# Patient Record
Sex: FEMALE | Race: WHITE | Employment: FULL TIME | URBAN - METROPOLITAN AREA
[De-identification: names, ages, dates, MRNs, and addresses within clinical notes are randomized per-mention and may not be internally consistent; named-entity substitution may affect disease eponyms.]

---

## 2017-01-17 ENCOUNTER — TELEPHONE (OUTPATIENT)
Dept: FAMILY MEDICINE | Facility: CLINIC | Age: 52
End: 2017-01-17

## 2017-01-17 NOTE — TELEPHONE ENCOUNTER
She had vomiting and diarrhea several days ago. Her vomiting improved, several days later her diarrhea continues. When her symptoms first started she had severe stomach pain, her stomach pain improved. She was taking Pepto tablets and pushing fluids. She has been improving, but still feels weak. She noticed some blood in the toilet, she is concerned it may be a hemorrhoid, she has had hemorrhoids in the past. Patient advised to use her hemorrhoid cream, if bleeding persists, follow up with appointment tomorrow.

## 2017-02-13 ENCOUNTER — TELEPHONE (OUTPATIENT)
Dept: FAMILY MEDICINE | Facility: CLINIC | Age: 52
End: 2017-02-13

## 2017-02-13 DIAGNOSIS — Z86.2 HISTORY OF ANEMIA: Primary | ICD-10-CM

## 2017-02-13 DIAGNOSIS — N95.1 SYMPTOMATIC MENOPAUSAL OR FEMALE CLIMACTERIC STATES: ICD-10-CM

## 2017-02-13 NOTE — TELEPHONE ENCOUNTER
Patient would like an order for a blood test. She would like to go to the [facility] building. She can be reached at the incoming number.

## 2017-02-14 DIAGNOSIS — Z86.2 HISTORY OF ANEMIA: ICD-10-CM

## 2017-02-14 DIAGNOSIS — E55.9 VITAMIN D DEFICIENCY: ICD-10-CM

## 2017-02-14 LAB
DEPRECATED CALCIDIOL+CALCIFEROL SERPL-MC: 43 UG/L (ref 20–75)
FERRITIN SERPL-MCNC: 33 NG/ML (ref 8–252)
IRON SERPL-MCNC: 88 UG/DL (ref 35–180)

## 2017-02-15 ENCOUNTER — TELEPHONE (OUTPATIENT)
Dept: FAMILY MEDICINE | Facility: CLINIC | Age: 52
End: 2017-02-15

## 2017-02-15 NOTE — TELEPHONE ENCOUNTER
Patient concerned that her ferritin is lower than previous. She is wondering if she should take iron supplementation or multivitamin. Patient will start first with a MV and if she feels fatigue and symptoms of anemia, she will come in for lab and get CBC. Reviewed cbc from 2014 and this was WNL. She reports she has a hx of anemia and mother had hx of anemia as well. She is experience feelings of fatigue. She will start with MV and update clinic of symptoms of fatigue continue.

## 2017-06-14 DIAGNOSIS — E03.9 ACQUIRED HYPOTHYROIDISM: ICD-10-CM

## 2017-06-14 RX ORDER — LEVOTHYROXINE SODIUM 100 UG/1
100 TABLET ORAL DAILY
Qty: 60 TABLET | Refills: 0 | Status: SHIPPED | OUTPATIENT
Start: 2017-06-14 | End: 2017-08-11

## 2017-06-14 NOTE — TELEPHONE ENCOUNTER
Received refill request for levothyroxine. Patient due to be seen for thyroid check in August. Left message for patient informing her that temporary refill would be sent to get her through until she is due to be seen.

## 2017-06-17 ENCOUNTER — HOSPITAL ENCOUNTER (EMERGENCY)
Facility: CLINIC | Age: 52
Discharge: HOME | End: 2017-06-18
Attending: EMERGENCY MEDICINE | Admitting: EMERGENCY MEDICINE
Payer: COMMERCIAL

## 2017-06-17 ENCOUNTER — APPOINTMENT (OUTPATIENT)
Dept: GENERAL RADIOLOGY | Facility: CLINIC | Age: 52
End: 2017-06-17
Attending: EMERGENCY MEDICINE
Payer: COMMERCIAL

## 2017-06-17 VITALS
OXYGEN SATURATION: 96 % | HEIGHT: 65 IN | RESPIRATION RATE: 16 BRPM | SYSTOLIC BLOOD PRESSURE: 136 MMHG | DIASTOLIC BLOOD PRESSURE: 90 MMHG | TEMPERATURE: 98.6 F | HEART RATE: 88 BPM

## 2017-06-17 DIAGNOSIS — S92.355A CLOSED NONDISPLACED FRACTURE OF FIFTH METATARSAL BONE OF LEFT FOOT, INITIAL ENCOUNTER: ICD-10-CM
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
## 2017-06-17 PROCEDURE — 25000132 ZZH RX MED GY IP 250 OP 250 PS 637: Performed by: EMERGENCY MEDICINE

## 2017-06-17 PROCEDURE — 73610 X-RAY EXAM OF ANKLE: CPT | Mod: LT

## 2017-06-17 PROCEDURE — 73630 X-RAY EXAM OF FOOT: CPT | Mod: LT

## 2017-06-17 PROCEDURE — 99283 EMERGENCY DEPT VISIT LOW MDM: CPT | Mod: Z6 | Performed by: EMERGENCY MEDICINE

## 2017-06-17 PROCEDURE — 99283 EMERGENCY DEPT VISIT LOW MDM: CPT

## 2017-06-17 RX ORDER — ACETAMINOPHEN 500 MG
1000 TABLET ORAL ONCE
Status: COMPLETED | OUTPATIENT
Start: 2017-06-17 | End: 2017-06-17

## 2017-06-17 RX ORDER — MULTIVITAMIN,THERAPEUTIC
1 TABLET ORAL DAILY
COMMUNITY
End: 2020-10-28

## 2017-06-17 RX ADMIN — ACETAMINOPHEN 1000 MG: 500 TABLET, FILM COATED ORAL at 23:56

## 2017-06-17 NOTE — ED AVS SNAPSHOT
[facility], Emergency Department    [address] [address]    Phone:  [phone]                                       Paradise Michael   MRN: [id number]    Department:  [facility], Emergency Department   Date of Visit:  6/17/2017           Patient Information     Date Of Birth          1965        Your diagnoses for this visit were:     Closed nondisplaced fracture of fifth metatarsal bone of left foot, initial encounter        You were seen by Zak Mayo MD.        Discharge Instructions       Please make an appointment to follow up with Orthopedics (phone: [phone]) in 3-5 days.    Ice and elevation.    Keep foot immobilized in boot until follow-up.    Do not bear weight on left foot, use crutches for ambulation.    Tylenol for pain.    Return to the emergency Department for any problems.      24 Hour Appointment Hotline       To make an appointment at any [facility] clinic, call [phone] ([phone]). If you don't have a family doctor or clinic, we will help you find one. [facility] clinics are conveniently located to serve the needs of you and your family.          ED Discharge Orders     Crutches       Use gait belt during crutch training.            Equalizer Walker           ORTHO  REFERRAL       [facility] is referring you to the Orthopedic  Services at [facility].       The  Representative will assist you in the coordination of your Orthopedic and Musculoskeletal Care as prescribed by your physician.    The  Representative will call you within 1 business day to help schedule your appointment, or you may contact the  Representative at:    All areas ~ [phone]     Type of Referral : [facility] / Foot & Ankle Surgery       Timeframe requested: 3 - 5 days    Coverage of these services is subject to the terms and limitations of your health insurance plan.  Please  call member services at your health plan with any benefit or coverage questions.      If X-rays, CT or MRI's have been performed, please contact the facility where they were done to arrange for , prior to your scheduled appointment.  Please bring this referral request to your appointment and present it to your specialist.                     Review of your medicines      Our records show that you are taking the medicines listed below. If these are incorrect, please call your family doctor or clinic.        Dose / Directions Last dose taken    levothyroxine 100 MCG tablet   Commonly known as:  SYNTHROID/LEVOTHROID   Dose:  100 mcg   Quantity:  60 tablet        Take 1 tablet (100 mcg) by mouth daily   Refills:  0        multivitamin, therapeutic Tabs tablet   Dose:  1 tablet        Take 1 tablet by mouth daily   Refills:  0        norgestrel-ethinyl estradiol 0.3-30 MG-MCG per tablet   Commonly known as:  LO/OVRAL (28)   Quantity:  120 tablet        Take only active pills, no placebos for 3 months then 1 week of the placebo for a withdrawal bleed.   Refills:  4                Procedures and tests performed during your visit     Ankle XR, G/E 3 views, left    Foot XR, G/E 3 views, left      Orders Needing Specimen Collection     None      Pending Results     Date and Time Order Name Status Description    6/17/2017 2232 Ankle XR, G/E 3 views, left Preliminary     6/17/2017 2232 Foot XR, G/E 3 views, left Preliminary             Pending Culture Results     No orders found from 6/15/2017 to 6/18/2017.            Pending Results Instructions     If you had any lab results that were not finalized at the time of your Discharge, you can call the ED Lab Result RN at [phone]. You will be contacted by this team for any positive Lab results or changes in treatment. The nurses are available 7 days a week from 10A to 6:30P.  You can leave a message 24 hours per day and they will return your call.        Thank you for  choosing [facility]       Thank you for choosing [facility] for your care. Our goal is always to provide you with excellent care. Hearing back from our patients is one way we can continue to improve our services. Please take a few minutes to complete the written survey that you may receive in the mail after you visit with us. Thank you!        [employer] Information     [employer] gives you secure access to your electronic health record. If you see a primary care provider, you can also send messages to your care team and make appointments. If you have questions, please call your primary care clinic.  If you do not have a primary care provider, please call [phone] and they will assist you.        Care EveryWhere ID     This is your Care EveryWhere ID. This could be used by other organizations to access your [facility] medical records  [id number]        After Visit Summary       This is your record. Keep this with you and show to your community pharmacist(s) and doctor(s) at your next visit.

## 2017-06-17 NOTE — ED AVS SNAPSHOT
[facility], [place], Emergency Department    [address] [place] [address]    Phone:  [phone]                                       Paradise Michael   MRN: [id number]    Department:  [facility], Emergency Department   Date of Visit:  6/17/2017           After Visit Summary Signature Page     I have received my discharge instructions, and my questions have been answered. I have discussed any challenges I see with this plan with the nurse or doctor.    ..........................................................................................................................................  Patient/Patient Representative Signature      ..........................................................................................................................................  Patient Representative Print Name and Relationship to Patient    ..................................................               ................................................  Date                                            Time    ..........................................................................................................................................  Reviewed by Signature/Title    ...................................................              ..............................................  Date                                                            Time

## 2017-06-18 NOTE — ED NOTES
Pt presents to ED with complaints of left foot pain and swelling after twisting it a couple hours a go. Pt states she heard a pop when she twisted her foot. Pt is unable to bear weight to her foot.

## 2017-06-18 NOTE — DISCHARGE INSTRUCTIONS
Please make an appointment to follow up with Orthopedics (phone: [phone]) in 3-5 days.    Ice and elevation.    Keep foot immobilized in boot until follow-up.    Do not bear weight on left foot, use crutches for ambulation.    Tylenol for pain.    Return to the emergency Department for any problems.

## 2017-06-18 NOTE — ED PROVIDER NOTES
"  History     Chief Complaint   Patient presents with     Foot Pain     HPI  Paradise Michael is a 52 year old female who presents to the ED with a foot injury. Patient states a few hours ago she was walking in a parking ramp when she twisted her left foot and heard a \"crack\" noise and noted swelling. She states she went home elevated and iced her left foot. She states she is unable to bear weight on her left foot.  She states she took some Advil with some pain relief.     PAST MEDICAL HISTORY  Past Medical History:   Diagnosis Date     Abnormal Pap smear      Anemia      Bursitis of hip Right hip, twice     Celiac disease      Delayed menarche 15 yrs old     Hypothyroidism      Plantar fasciitis     Resolved     PAST SURGICAL HISTORY  Past Surgical History:   Procedure Laterality Date     COLONOSCOPY       COLPOSCOPY CERVIX, LOOP ELECTRODE BIOPSY, COMBINED  1992     FAMILY HISTORY  Family History   Problem Relation Age of Onset     CEREBROVASCULAR DISEASE Maternal Grandfather      DIABETES Maternal Grandfather      Hypertension Mother      Endocrine Disease Mother      hypothyroidism     Thyroid Disease Mother      Hyperlipidemia Mother      Arthritis Mother      CEREBROVASCULAR DISEASE Paternal Grandfather      Heart Surgery Father      Arthritis Father      CEREBROVASCULAR DISEASE Maternal Grandmother      Celiac Disease Sister      Anemia Sister      Thyroid Disease Sister      SOCIAL HISTORY  Social History   Substance Use Topics     Smoking status: Never Smoker     Smokeless tobacco: Never Used     Alcohol use 1.2 oz/week     2 Glasses of wine per week      Comment: Social alcohol intake     MEDICATIONS  No current facility-administered medications for this encounter.      Current Outpatient Prescriptions   Medication     multivitamin, therapeutic (THERA-VIT) TABS tablet     levothyroxine (SYNTHROID/LEVOTHROID) 100 MCG tablet     norgestrel-ethinyl estradiol (LO/OVRAL, 28,) 0.3-30 MG-MCG per tablet " "    ALLERGIES  Allergies   Allergen Reactions     Gluten      Wheat        I have reviewed the Medications, Allergies, Past Medical and Surgical History, and Social History in the Epic system.    Review of Systems   Musculoskeletal:        Positive for left foot pain and swelling.    All other systems reviewed and are negative.      Physical Exam   BP: 133/85  Pulse: 93  Temp: 98.6  F (37  C)  Resp: 16  Height: 165.1 cm (5' 5\")  SpO2: 98 %  Physical Exam   Constitutional: She is oriented to person, place, and time. She appears well-developed and well-nourished.  Non-toxic appearance. She does not appear ill. No distress.   HENT:   Head: Normocephalic and atraumatic.   Eyes: EOM are normal. Pupils are equal, round, and reactive to light. No scleral icterus.   Neck: Normal range of motion. Neck supple.   Cardiovascular: Normal rate.    Pulmonary/Chest: Effort normal. No respiratory distress.   Musculoskeletal:        Left foot: There is tenderness, bony tenderness and swelling. There is normal capillary refill, no crepitus, no deformity and no laceration.        Feet:    Neurological: She is alert and oriented to person, place, and time. She has normal strength. No sensory deficit.   Skin: Skin is warm and dry. No rash noted. No pallor.   Psychiatric: She has a normal mood and affect. Her behavior is normal.   Nursing note and vitals reviewed.      ED Course     ED Course     Procedures   10:19 PM  The patient was seen and examined by Dr. Mayo in Room 17.           Results for orders placed or performed during the hospital encounter of 06/17/17   Foot XR, G/E 3 views, left    Narrative    Exam: XR FOOT LT G/E 3 VW, 6/17/2017 11:16 PM    Indication: Trauma    Comparison: None available    Findings:   Nonweightbearing AP, oblique, and lateral views of the left foot were  obtained. Redemonstration of horizontally oriented minimally displaced  fracture of the proximal fifth metatarsal bone at the " metadiaphysis  approximately 1.5 cm from the tip of the fifth metatarsal. Mild  adjacent soft tissue swelling. No other fractures appreciated.      Impression    Impression:   Minimally displaced fracture through the proximal metadiaphysis of the  fifth metatarsal.    I have personally reviewed the examination and initial interpretation  and I agree with the findings.    AISSATOU FLEMING MD   Ankle XR, G/E 3 views, left    Narrative    Exam: XR ANKLE LT G/E 3 VW, 6/17/2017 11:10 PM    Indication: Trauma    Comparison: None available    Findings:   Nonweightbearing AP, oblique, and lateral views of the left ankle were  obtained. The ankle mortise is congruent. Minimally displaced fracture  through the proximal fifth metatarsal.. Prominent calcaneal plantar  enthesophyte. Mild soft tissue swelling about the lateral aspect of  the midfoot.      Impression    Impression:   Minimally displaced fracture of the proximal fifth metatarsal.    I have personally reviewed the examination and initial interpretation  and I agree with the findings.    AISSATOU FLEMING MD            Assessments & Plan (with Medical Decision Making)     This patient presented emergency department after suffering an inversion injury to her left foot.  She is neurovascularly intact but does have a fracture through the base of the 5th metatarsal.  She was placed in a cam boot and given crutches with instructions to be non-weight bearing and follow up with orthopedics this week.  She was discharged in good condition.      This part of the medical record was transcribed by Len Salinas Scribe, from a dictation done by Zak Mayo MD.     I have reviewed the nursing notes.    I have reviewed the findings, diagnosis, plan and need for follow up with the patient.    Discharge Medication List as of 6/17/2017 11:55 PM          Final diagnoses:   Closed nondisplaced fracture of fifth metatarsal bone of left foot, initial encounter     I,  Erlin Sheldon, am serving as a trained medical scribe to document services personally performed by Zak Mayo MD, based on the provider's statements to me.      I, Zak Mayo MD, was physically present and have reviewed and verified the accuracy of this note documented by Erlin Sheldon.     6/17/2017   [facility], [place], EMERGENCY DEPARTMENT     Zak Mayo MD  06/22/17 [id number]

## 2017-07-27 NOTE — TELEPHONE ENCOUNTER
Patient is calling in to go over some blood test results. She would like a call back at the incoming number.   not applicable (Male)

## 2017-08-01 ENCOUNTER — MYC MEDICAL ADVICE (OUTPATIENT)
Dept: FAMILY MEDICINE | Facility: CLINIC | Age: 52
End: 2017-08-01

## 2017-08-01 DIAGNOSIS — E03.9 ACQUIRED HYPOTHYROIDISM: Primary | ICD-10-CM

## 2017-08-02 ENCOUNTER — MYC MEDICAL ADVICE (OUTPATIENT)
Dept: FAMILY MEDICINE | Facility: CLINIC | Age: 52
End: 2017-08-02

## 2017-10-19 DIAGNOSIS — E03.9 ACQUIRED HYPOTHYROIDISM: ICD-10-CM

## 2017-10-19 LAB — TSH SERPL DL<=0.005 MIU/L-ACNC: 0.66 MU/L (ref 0.4–4)

## 2017-12-20 ENCOUNTER — RADIANT APPOINTMENT (OUTPATIENT)
Dept: MAMMOGRAPHY | Facility: CLINIC | Age: 52
End: 2017-12-20
Attending: FAMILY MEDICINE
Payer: COMMERCIAL

## 2017-12-20 DIAGNOSIS — Z12.31 VISIT FOR SCREENING MAMMOGRAM: ICD-10-CM

## 2018-01-30 ENCOUNTER — TRANSFERRED RECORDS (OUTPATIENT)
Dept: HEALTH INFORMATION MANAGEMENT | Facility: CLINIC | Age: 53
End: 2018-01-30

## 2018-02-08 ENCOUNTER — TRANSFERRED RECORDS (OUTPATIENT)
Dept: HEALTH INFORMATION MANAGEMENT | Facility: CLINIC | Age: 53
End: 2018-02-08

## 2018-02-16 ENCOUNTER — TELEPHONE (OUTPATIENT)
Dept: OBGYN | Facility: CLINIC | Age: 53
End: 2018-02-16

## 2018-02-16 NOTE — TELEPHONE ENCOUNTER
Tried to reach Paradise but received voicemail.  Left message that note is being forwarded to DR. Snow to see if she would like an office visit to review these labs.    Spoke with Paradise about her symptoms and plan of care:    She will stay on OCP's and after 3-4 months if bleeding has not resolved she will consider revisit with GYN for US and possible endometrial ablation     Elly Snow MD

## 2018-04-20 ENCOUNTER — TELEPHONE (OUTPATIENT)
Dept: FAMILY MEDICINE | Facility: CLINIC | Age: 53
End: 2018-04-20

## 2018-04-20 NOTE — TELEPHONE ENCOUNTER
Noted pt on clinic schedule Sat AM for some chest discomfort and lightheadedness, that has been ongoing. Call to pt to further assess symptoms - unable to reach pt. LM for her to call triage line, if symptoms acute and bothersome.

## 2018-04-20 NOTE — TELEPHONE ENCOUNTER
Spoke to pt - reports occ chest discomfort, not related to activity. Also lightheaded , at times, not related to any other symptoms. Pt without cardiac history. Pt states she is in perimenopause, also. Discussed at length, with pt - she will come to clinic to discuss her symptoms tomorrow AM.  Experiencing no symptoms today.  She agrees with plan - she will call back prn.

## 2018-04-21 ENCOUNTER — OFFICE VISIT (OUTPATIENT)
Dept: FAMILY MEDICINE | Facility: CLINIC | Age: 53
End: 2018-04-21
Payer: COMMERCIAL

## 2018-04-21 VITALS
TEMPERATURE: 97.6 F | DIASTOLIC BLOOD PRESSURE: 81 MMHG | OXYGEN SATURATION: 100 % | HEIGHT: 65 IN | HEART RATE: 84 BPM | WEIGHT: 183.25 LBS | BODY MASS INDEX: 30.53 KG/M2 | SYSTOLIC BLOOD PRESSURE: 132 MMHG

## 2018-04-21 DIAGNOSIS — R07.89 ATYPICAL CHEST PAIN: Primary | ICD-10-CM

## 2018-04-21 DIAGNOSIS — Z87.42 HX OF MENORRHAGIA: ICD-10-CM

## 2018-04-21 LAB
BASOPHILS # BLD AUTO: 0 10E9/L (ref 0–0.2)
BASOPHILS NFR BLD AUTO: 0.4 %
DIFFERENTIAL METHOD BLD: NORMAL
EOSINOPHIL # BLD AUTO: 0.1 10E9/L (ref 0–0.7)
EOSINOPHIL NFR BLD AUTO: 0.9 %
ERYTHROCYTE [DISTWIDTH] IN BLOOD BY AUTOMATED COUNT: 12.5 % (ref 10–15)
HCT VFR BLD AUTO: 39.9 % (ref 35–47)
HGB BLD-MCNC: 13.5 G/DL (ref 11.7–15.7)
IMM GRANULOCYTES # BLD: 0 10E9/L (ref 0–0.4)
IMM GRANULOCYTES NFR BLD: 0.1 %
LYMPHOCYTES # BLD AUTO: 2.8 10E9/L (ref 0.8–5.3)
LYMPHOCYTES NFR BLD AUTO: 40.8 %
MCH RBC QN AUTO: 31.3 PG (ref 26.5–33)
MCHC RBC AUTO-ENTMCNC: 33.8 G/DL (ref 31.5–36.5)
MCV RBC AUTO: 92 FL (ref 78–100)
MONOCYTES # BLD AUTO: 0.6 10E9/L (ref 0–1.3)
MONOCYTES NFR BLD AUTO: 8.4 %
NEUTROPHILS # BLD AUTO: 3.4 10E9/L (ref 1.6–8.3)
NEUTROPHILS NFR BLD AUTO: 49.4 %
NRBC # BLD AUTO: 0 10*3/UL
NRBC BLD AUTO-RTO: 0 /100
PLATELET # BLD AUTO: 235 10E9/L (ref 150–450)
RBC # BLD AUTO: 4.32 10E12/L (ref 3.8–5.2)
WBC # BLD AUTO: 6.8 10E9/L (ref 4–11)

## 2018-04-21 ASSESSMENT — PAIN SCALES - GENERAL: PAINLEVEL: NO PAIN (0)

## 2018-04-21 NOTE — MR AVS SNAPSHOT
After Visit Summary   4/21/2018    Paradise Michael    MRN: [id number]           Patient Information     Date Of Birth          1965        Visit Information        Provider Department      4/21/2018 10:40 AM Ingris Triana PA-C [facility]        Today's Diagnoses     Atypical chest pain    -  1    Hx of menorrhagia          Care Instructions    Your EKG was normal today and though your chest discomfort does not appear to be cardiac in cause I would like you to schedule a stress test for evaluation.  In the meant time I would like you to try an antacid such as OTC ranitidine/zantac 75mg taken twice daily for 1-2 weeks.  I would like to check your blood count today as a low iron count can cause issues with your heart.  Please let me know if you have any questions.  Thank you for allowing me to participate in your care.  It was my pleasure meeting you.  Fatuma Triana PA-C    The scheduling number for the echocardiogram is [phone].  This is at [facility].    *CHEST PAIN, NONCARDIAC    Based on your visit today, the exact cause of your chest pain is not certain. Your condition does not seem serious and your pain does not appear to be coming from your heart. However, sometimes the signs of a serious problem take more time to appear. Therefore, please watch for the warning signs listed below.  HOME CARE:  1. Rest today and avoid strenuous activity.  2. Take any prescribed medicine as directed.  FOLLOW UP with your doctor in 1-3 days.   GET PROMPT MEDICAL ATTENTION if any of the following occur:    A change in the type of pain: if it feels different, becomes more severe, lasts longer, or begins to spread into your shoulder, arm, neck, jaw or back    Shortness of breath or increased pain with breathing    Cough with blood or dark colored sputum (phlegm)    Weakness, dizziness, or fainting    Fever over 101  F (38.3  C)    Swelling, pain or redness in one leg    1846-5242 [facility]  "[employer]. [address] [place] [address]. All rights reserved. This information is not intended as a substitute for professional medical care. Always follow your healthcare professional's instructions.  This information has been modified by your health care provider with permission from the publisher.            Follow-ups after your visit        Future tests that were ordered for you today     Open Future Orders        Priority Expected Expires Ordered    Exercise Stress Echocardiogram Routine  4/21/2019 4/21/2018            Who to contact     Please call your clinic at [phone] to:    Ask questions about your health    Make or cancel appointments    Discuss your medicines    Learn about your test results    Speak to your doctor            Additional Information About Your Visit        [employer] Information     [employer] gives you secure access to your electronic health record. If you see a primary care provider, you can also send messages to your care team and make appointments. If you have questions, please call your primary care clinic.  If you do not have a primary care provider, please call [phone] and they will assist you.      [employer] is an electronic gateway that provides easy, online access to your medical records. With [employer], you can request a clinic appointment, read your test results, renew a prescription or communicate with your care team.     To access your existing account, please contact your [facility] or call [phone] for assistance.        Care EveryWhere ID     This is your Care EveryWhere ID. This could be used by other organizations to access your [facility] medical records  [id number]        Your Vitals Were     Pulse Temperature Height Last Period Pulse Oximetry BMI (Body Mass Index)    84 97.6  F (36.4  C) (Temporal) 5' 5\" (165.1 cm) 04/02/2018 (Approximate) 100% 30.49 kg/m2       Blood Pressure from Last 3 Encounters:   04/21/18 " 132/81   06/17/17 136/90   12/12/16 137/85    Weight from Last 3 Encounters:   04/21/18 183 lb 4 oz (83.1 kg)   12/12/16 174 lb (78.9 kg)   08/19/16 172 lb 1.9 oz (78.1 kg)              We Performed the Following     CBC with platelets differential     EKG 12-lead complete w/read - Clinics        Primary Care Provider Office Phone # Fax #    Elly Snow [phone]       [address] [facility] [address]  [facility] [address]        Equal Access to Services     [facility]: Hadii donnie montgomery hadasho Soomaali, waaxda luqadaha, qaybta kaalmabala lee, araseli leon . So [facility] [phone].    ATENCIÓN: Si habla español, tiene a lutz disposición servicios gratuitos de asistencia lingüística. [facility] [phone].    We comply with applicable federal civil rights laws and [place] laws. We do not discriminate on the basis of race, color, national origin, age, disability, sex, sexual orientation, or gender identity.            Thank you!     Thank you for choosing [facility]  for your care. Our goal is always to provide you with excellent care. Hearing back from our patients is one way we can continue to improve our services. Please take a few minutes to complete the written survey that you may receive in the mail after your visit with us. Thank you!             Your Updated Medication List - Protect others around you: Learn how to safely use, store and throw away your medicines at www.disposemymeds.org.          This list is accurate as of 4/21/18 12:21 PM.  Always use your most recent med list.                   Brand Name Dispense Instructions for use Diagnosis    levothyroxine 100 MCG tablet    SYNTHROID/LEVOTHROID    90 tablet    Take 1 tablet (100 mcg) by mouth daily    Acquired hypothyroidism       multivitamin, therapeutic Tabs tablet      Take 1 tablet by mouth daily        norgestrel-ethinyl estradiol 0.3-30 MG-MCG per tablet    LO/OVRAL (28)    120 tablet    Take only  active pills, no placebos for 3 months then 1 week of the placebo for a withdrawal bleed.    Symptomatic menopausal or female climacteric states       VITAMIN D (CHOLECALCIFEROL) PO      Take 1,000 Units by mouth daily

## 2018-04-21 NOTE — PROGRESS NOTES
"  SUBJECTIVE:   Paradise Michael is a 52 year old female whose PCP is Dr. Snow and GYN provider. She considers herself to be generally healthy though she has been struggling with menorrhagia and perimenopausal symptoms over the past year. She has had a physical with her GYN provider within the last year. She has had biometric screening through her workplace within the last couple months. Patient does have a history of hypothyroidism andceliac disease which she controls fairly well with diet. She presents to clinic today for the following health issues:    CHEST PAIN     Onset: Described as uncomfortable feeling in her upper chest. Can last for several hours.  Not associated with exercise.  She is concerned about cardiac issues. First remembers an issue on and off for several months.  No increased frequency.   Has not identified any triggers.  Not related to certain time of day.    Description:   Location:  Central chest right of sternum.  Character: Cannot describe\" feels uncomfortable  Radiation: none  Duration: several hours to whole day    Intensity: mild    Progression of Symptoms:  same    Accompanying Signs & Symptoms:  Shortness of breath: no  Sweating: no  Nausea/vomiting: no  Lightheadedness: yes:  Not necessarily related to the chest pain  Palpitations: no  Fever/Chills: no  Cough: no  Heartburn: no    History:   Family history of heart disease YES- but not early onset  Tobacco use: no     Precipitating factors:   Worse with exertion: no   Worse with deep breaths :  no   Related to food: no     Alleviating factors:  Has not found anything       Therapies Tried and outcome: has not tried anything.    Has noted significant weight gain over the past couple years.  No heart burn or reflux.    CARDIAC RISK FACTORS  SEX:                F  Tobacco:            No  Hypertension:       No  Diabetes:           No  Lipids:             Normal--has checked regularly at work place.  Has been normal TC with HDL in good " range.  She will have recent results sent.  Family History:     Positive for CAD in her father age 70--3 grandparents with CVA  Exercise:           none    Lightheaded:  Occurs maybe 1-2 times per week. Has been present for the past few months. Does not feel like she is going to pass out.  Is able to continue all normal activities.  Could be related to fatigue and or perimenopausal symptoms. Denies vertigo and balance issues. May note the symptoms a couple times per week but she is not really sure. This symptom is not necessarily related to the above chest discomfort.      Description:   Do you feel faint:  no   Does it feel like the surroundings (bed, room) are moving: no   Unsteady/off balance: no   Have you passed out or fallen: no     Intensity: mild    Progression of Symptoms:  same    Accompanying Signs & Symptoms:  Heart palpitations: no   Nausea, vomiting: no   Weakness in arms or legs: no   Fatigue: YES  Vision or speech changes: no   Ringing in ears (Tinnitus): no   Hearing Loss: no     History:   Head trauma/concussion hx: no   Previous similar symptoms: no   Recent bleeding history: YES- menorrhagia    Precipitating factors:   Worse with activity or head movement: no   Any new medications (BP?): no   Alcohol/drug abuse/withdrawal: no     Alleviating factors:   Does staying in a fixed position give relief:  no     Therapies Tried and outcome: None    Problem list and histories reviewed & adjusted, as indicated.  Additional history: as documented    Patient Active Problem List   Diagnosis     Presbyopia - Both Eyes     Screening for cervical cancer     Celiac disease     Hypothyroidism     Past Surgical History:   Procedure Laterality Date     COLONOSCOPY       COLPOSCOPY CERVIX, LOOP ELECTRODE BIOPSY, COMBINED  1992       Social History   Substance Use Topics     Smoking status: Never Smoker     Smokeless tobacco: Never Used     Alcohol use 1.2 oz/week     2 Glasses of wine per week      Comment: Social  alcohol intake     Family History   Problem Relation Age of Onset     CEREBROVASCULAR DISEASE Maternal Grandfather      DIABETES Maternal Grandfather      Hypertension Mother      Endocrine Disease Mother      hypothyroidism     Thyroid Disease Mother      Hyperlipidemia Mother      Arthritis Mother      CEREBROVASCULAR DISEASE Paternal Grandfather      Heart Surgery Father      CABG and stent placement     Arthritis Father      CEREBROVASCULAR DISEASE Maternal Grandmother      Celiac Disease Sister      Anemia Sister      Thyroid Disease Sister          Current Outpatient Prescriptions   Medication Sig Dispense Refill     levothyroxine (SYNTHROID/LEVOTHROID) 100 MCG tablet Take 1 tablet (100 mcg) by mouth daily 90 tablet 3     multivitamin, therapeutic (THERA-VIT) TABS tablet Take 1 tablet by mouth daily       norgestrel-ethinyl estradiol (LO/OVRAL, 28,) 0.3-30 MG-MCG per tablet Take only active pills, no placebos for 3 months then 1 week of the placebo for a withdrawal bleed. 120 tablet 4     VITAMIN D, CHOLECALCIFEROL, PO Take 1,000 Units by mouth daily       Allergies   Allergen Reactions     Gluten      Wheat      BP Readings from Last 3 Encounters:   04/21/18 132/81   06/17/17 136/90   12/12/16 137/85    Wt Readings from Last 3 Encounters:   04/21/18 183 lb 4 oz (83.1 kg)   12/12/16 174 lb (78.9 kg)   08/19/16 172 lb 1.9 oz (78.1 kg)               Reviewed and updated as needed this visit by clinical staff  Tobacco  Allergies  Meds  Problems  Med Hx  Surg Hx  Fam Hx  Soc Hx        Reviewed and updated as needed this visit by Provider  Tobacco  Allergies  Meds  Problems  Med Hx  Surg Hx  Fam Hx  Soc Hx          ROS:  Constitutional, HEENT, cardiovascular, pulmonary, gi and gu systems are negative, except as otherwise noted. Perimenopausal symptoms with Heavy periods. Hemoglobin  normal  2/2018 per patient history..    OBJECTIVE:     /81 (BP Location: Right arm, Patient Position: Chair, Cuff  "Size: Adult Regular)  Pulse 84  Temp 97.6  F (36.4  C) (Temporal)  Ht 5' 5\" (165.1 cm)  Wt 183 lb 4 oz (83.1 kg)  LMP 04/02/2018 (Approximate)  SpO2 100%  BMI 30.49 kg/m2  Body mass index is 30.49 kg/(m^2).  GENERAL: healthy, alert and no distress  HENT: ear canals and TM's normal, nose and mouth without ulcers or lesions  NECK: no adenopathy, no asymmetry, masses, or scars and thyroid normal to palpation  RESP: lungs clear to auscultation - no rales, rhonchi or wheezes  CV: regular rate and rhythm, normal S1 S2, no S3 or S4, no murmur, click or rub, no peripheral edema and peripheral pulses strong  ABDOMEN: soft, nontender, no hepatosplenomegaly, no masses and bowel sounds normal  MS: no gross musculoskeletal defects noted, no edema  SKIN: no suspicious lesions or rashes  NEURO: Normal strength and tone, mentation intact and speech normal    Diagnostic Test Results:       EKG Interpretation:      EKG Number: 1  Interpreted by Fatuma Triana PA-C  Symptoms at time of EKG: none   Rhythm: normal sinus   Rate:71  Axis: NORMAL  Ectopy: none  Conduction: normal  ST Segments/ T Waves: No ST-T wave changes  Q Waves: none  Comparison to prior: No old EKG available    Clinical Impression: normal EKG    ASSESSMENT/PLAN:       ICD-10-CM    1. Atypical chest pain R07.89 CBC with platelets differential     EKG 12-lead complete w/read - Clinics     Exercise Stress Echocardiogram   2. Hx of menorrhagia Z87.42 CBC with platelets differential     Reassurance given normal EKG but unclear etiology of atypical chest pain. Patient is specifically concerned about cardiac disease.  Risk factors were reviewed.  Will order and recommend stress echo. Unclear etiology of lightheadedness though symptoms are very mild. Encouraged patient to discuss with PCP if symptoms persist or worsen in any way.    Patient Instructions   Your EKG was normal today and though your chest discomfort does not appear to be cardiac in cause I would like you to " schedule a stress test for evaluation.  In the meant time I would like you to try an antacid such as OTC ranitidine/zantac 75mg taken twice daily for 1-2 weeks.  I would like to check your blood count today as a low iron count can cause issues with your heart.  See patient instructions.        Ingris Triana PA-C  [facility]

## 2018-04-21 NOTE — NURSING NOTE
"52 year old  Chief Complaint   Patient presents with     RECHECK     Symptoms over the past few months. Light headedness and chest discomfort. Comes and goes. Last incident was 2 nights ago on 4/19/18. But originally started about 2 months ago.        Blood pressure 132/81, pulse 84, temperature 97.6  F (36.4  C), temperature source Temporal, height 5' 5\" (165.1 cm), weight 183 lb 4 oz (83.1 kg), last menstrual period 04/02/2018, SpO2 100 %, not currently breastfeeding. Body mass index is 30.49 kg/(m^2).  Patient Active Problem List   Diagnosis     Presbyopia - Both Eyes     Screening for cervical cancer     Celiac disease     Hypothyroidism       Wt Readings from Last 2 Encounters:   04/21/18 183 lb 4 oz (83.1 kg)   12/12/16 174 lb (78.9 kg)     BP Readings from Last 3 Encounters:   04/21/18 132/81   06/17/17 136/90   12/12/16 137/85         Current Outpatient Prescriptions   Medication     levothyroxine (SYNTHROID/LEVOTHROID) 100 MCG tablet     multivitamin, therapeutic (THERA-VIT) TABS tablet     norgestrel-ethinyl estradiol (LO/OVRAL, 28,) 0.3-30 MG-MCG per tablet     VITAMIN D, CHOLECALCIFEROL, PO     No current facility-administered medications for this visit.        Social History   Substance Use Topics     Smoking status: Never Smoker     Smokeless tobacco: Never Used     Alcohol use 1.2 oz/week     2 Glasses of wine per week      Comment: Social alcohol intake       Health Maintenance Due   Topic Date Due     COLON CANCER SCREEN (SYSTEM ASSIGNED)  04/25/2017     INFLUENZA VACCINE (1) 09/01/2017       Lab Results   Component Value Date    PAP NIL 12/12/2016       Es Corbin MA  April 21, 2018 10:39 AM    "

## 2018-04-21 NOTE — PATIENT INSTRUCTIONS
Your EKG was normal today and though your chest discomfort does not appear to be cardiac in cause I would like you to schedule a stress test for evaluation.  In the meant time I would like you to try an antacid such as OTC ranitidine/zantac 75mg taken twice daily for 1-2 weeks.  I would like to check your blood count today as a low iron count can cause issues with your heart.  Please let me know if you have any questions.  Thank you for allowing me to participate in your care.  It was my pleasure meeting you.  Fatuma Triana PA-C    The scheduling number for the echocardiogram is [phone].  This is at [facility].    *CHEST PAIN, NONCARDIAC    Based on your visit today, the exact cause of your chest pain is not certain. Your condition does not seem serious and your pain does not appear to be coming from your heart. However, sometimes the signs of a serious problem take more time to appear. Therefore, please watch for the warning signs listed below.  HOME CARE:  1. Rest today and avoid strenuous activity.  2. Take any prescribed medicine as directed.  FOLLOW UP with your doctor in 1 3 days.   GET PROMPT MEDICAL ATTENTION if any of the following occur:    A change in the type of pain: if it feels different, becomes more severe, lasts longer, or begins to spread into your shoulder, arm, neck, jaw or back    Shortness of breath or increased pain with breathing    Cough with blood or dark colored sputum (phlegm)    Weakness, dizziness, or fainting    Fever over 101  F (38.3  C)    Swelling, pain or redness in one leg    2566-6004 [employer]. [address] [place] [address], [place] [address]. All rights reserved. This information is not intended as a substitute for professional medical care. Always follow your healthcare professional's instructions.  This information has been modified by your health care provider with permission from the publisher.

## 2018-04-24 ASSESSMENT — ANXIETY QUESTIONNAIRES
7. FEELING AFRAID AS IF SOMETHING AWFUL MIGHT HAPPEN: NOT AT ALL
6. BECOMING EASILY ANNOYED OR IRRITABLE: SEVERAL DAYS
GAD7 TOTAL SCORE: 2
5. BEING SO RESTLESS THAT IT IS HARD TO SIT STILL: NOT AT ALL
1. FEELING NERVOUS, ANXIOUS, OR ON EDGE: SEVERAL DAYS
3. WORRYING TOO MUCH ABOUT DIFFERENT THINGS: NOT AT ALL
IF YOU CHECKED OFF ANY PROBLEMS ON THIS QUESTIONNAIRE, HOW DIFFICULT HAVE THESE PROBLEMS MADE IT FOR YOU TO DO YOUR WORK, TAKE CARE OF THINGS AT HOME, OR GET ALONG WITH OTHER PEOPLE: NOT DIFFICULT AT ALL
2. NOT BEING ABLE TO STOP OR CONTROL WORRYING: NOT AT ALL

## 2018-04-24 ASSESSMENT — PATIENT HEALTH QUESTIONNAIRE - PHQ9: 5. POOR APPETITE OR OVEREATING: NOT AT ALL

## 2018-04-25 ASSESSMENT — PATIENT HEALTH QUESTIONNAIRE - PHQ9: SUM OF ALL RESPONSES TO PHQ QUESTIONS 1-9: 4

## 2018-04-25 ASSESSMENT — ANXIETY QUESTIONNAIRES: GAD7 TOTAL SCORE: 2

## 2018-08-14 DIAGNOSIS — E03.9 ACQUIRED HYPOTHYROIDISM: ICD-10-CM

## 2018-08-14 RX ORDER — LEVOTHYROXINE SODIUM 100 UG/1
100 TABLET ORAL DAILY
Qty: 90 TABLET | Refills: 2 | Status: SHIPPED | OUTPATIENT
Start: 2018-08-14 | End: 2019-05-23

## 2018-08-14 NOTE — TELEPHONE ENCOUNTER
Last office visit: 4/21/18, no future appointments.     Prescription approved per [facility] Refill Protocol.

## 2019-05-23 DIAGNOSIS — E03.9 ACQUIRED HYPOTHYROIDISM: ICD-10-CM

## 2019-05-24 ENCOUNTER — MYC MEDICAL ADVICE (OUTPATIENT)
Dept: FAMILY MEDICINE | Facility: CLINIC | Age: 54
End: 2019-05-24

## 2019-05-24 RX ORDER — LEVOTHYROXINE SODIUM 100 UG/1
TABLET ORAL
Qty: 30 TABLET | Refills: 0 | Status: SHIPPED | OUTPATIENT
Start: 2019-05-24 | End: 2020-10-28

## 2019-05-24 NOTE — TELEPHONE ENCOUNTER
Last Office Visit: 8/11/17  [place] [facility] [place] none  Medication last refilled: 8/14/18 #90 +  Refills  Last labs from outside provider: 2/18/18    Medication is being filled for 1 time refill only due to:  Patient needs labs TSH. Patient needs to be seen because it has been more than one year since last visit.    Pia Madden RN  05/24/19  11:59 AM

## 2020-03-02 ENCOUNTER — HEALTH MAINTENANCE LETTER (OUTPATIENT)
Age: 55
End: 2020-03-02

## 2020-03-11 LAB
HPV ABSTRACT: NORMAL
MAMMOGRAM: NORMAL
PAP-ABSTRACT: NORMAL

## 2020-10-28 ENCOUNTER — OFFICE VISIT (OUTPATIENT)
Dept: FAMILY MEDICINE | Facility: CLINIC | Age: 55
End: 2020-10-28
Payer: COMMERCIAL

## 2020-10-28 VITALS
BODY MASS INDEX: 28.56 KG/M2 | SYSTOLIC BLOOD PRESSURE: 136 MMHG | RESPIRATION RATE: 16 BRPM | OXYGEN SATURATION: 99 % | DIASTOLIC BLOOD PRESSURE: 87 MMHG | WEIGHT: 182 LBS | HEART RATE: 89 BPM | HEIGHT: 67 IN | TEMPERATURE: 97.3 F

## 2020-10-28 DIAGNOSIS — E03.9 ACQUIRED HYPOTHYROIDISM: Primary | ICD-10-CM

## 2020-10-28 DIAGNOSIS — Z13.220 LIPID SCREENING: ICD-10-CM

## 2020-10-28 PROBLEM — M70.72 BURSITIS OF LEFT HIP: Status: ACTIVE | Noted: 2020-10-28

## 2020-10-28 LAB
CHOLEST SERPL-MCNC: 171 MG/DL (ref 0–200)
CHOLEST/HDLC SERPL: 3 {RATIO} (ref 0–5)
FASTING SPECIMEN: NO
HDLC SERPL-MCNC: 57 MG/DL
LDLC SERPL CALC-MCNC: 91 MG/DL (ref 0–129)
T3 SERPL-MCNC: 96 NG/DL (ref 60–181)
T4 FREE SERPL-MCNC: 1.22 NG/DL (ref 0.76–1.46)
TRIGL SERPL-MCNC: 114 MG/DL (ref 0–150)
TSH SERPL DL<=0.005 MIU/L-ACNC: 4.38 MU/L (ref 0.4–4)
VLDL-CHOLESTEROL: 23 (ref 7–32)

## 2020-10-28 RX ORDER — LEVOTHYROXINE SODIUM 112 UG/1
112 TABLET ORAL DAILY
Qty: 90 TABLET | Refills: 3 | Status: SHIPPED | OUTPATIENT
Start: 2020-10-28 | End: 2021-01-04

## 2020-10-28 RX ORDER — CELECOXIB 200 MG/1
CAPSULE ORAL
COMMUNITY
End: 2022-03-31

## 2020-10-28 RX ORDER — LEVOTHYROXINE SODIUM 112 UG/1
TABLET ORAL
COMMUNITY
End: 2020-10-29

## 2020-10-28 ASSESSMENT — MIFFLIN-ST. JEOR: SCORE: 1455.24

## 2020-10-28 NOTE — PROGRESS NOTES
"Subjective     Paradise Michael is a 55 year old female who presents to clinic today for the following health issues:    Hypothyroidism Follow-up: Has been moving but now back in [place].  In another state with TSH recheck an increase in medication was recommended.  Her initial follow up S/P change was normal.  She is due for follow uip and needed a refill of her medication.  Care Everywhere reviewed.    Since last visit, patient describes the following symptoms: Weight stable, no hair loss, no skin changes, no constipation, no loose stools  Wt Readings from Last 3 Encounters:   10/28/20 82.6 kg (182 lb)   04/21/18 83.1 kg (183 lb 4 oz)   12/12/16 78.9 kg (174 lb)     Health maintenance recommendations reviewed. And orders placed.  Vaccine recommended.      Review of Systems   CONSTITUTIONAL: NEGATIVE for fever, chills, change in weight  ENT/MOUTH: NEGATIVE for ear, mouth and throat problems  RESP: NEGATIVE for significant cough or SOB  CV: NEGATIVE for chest pain, palpitations or peripheral edema      Objective    /87 (BP Location: Right arm, Patient Position: Sitting, Cuff Size: Adult Large)   Pulse 89   Temp 97.3  F (36.3  C) (Skin)   Resp 16   Ht 1.689 m (5' 6.5\")   Wt 82.6 kg (182 lb)   SpO2 99%   BMI 28.94 kg/m    Body mass index is 28.94 kg/m .  Physical Exam   GENERAL: healthy, alert and no distress  NECK: no adenopathy, no asymmetry, masses, or scars and thyroid normal to palpation  RESP: lungs clear to auscultation - no rales, rhonchi or wheezes  CV: regular rate and rhythm, normal S1 S2, no S3 or S4, no murmur, click or rub, no peripheral edema and peripheral pulses strong  MS: no gross musculoskeletal defects noted, no edema  SKIN: no suspicious lesions or rashes            Assessment & Plan     Acquired hypothyroidism  - levothyroxine (SYNTHROID/LEVOTHROID) 112 MCG tablet  Dispense: 90 tablet; Refill: 3  - TSH  - T4 free  - T3 total    Lipid screening  - Lipid Panel ([place])       BMI: " "  Estimated body mass index is 28.94 kg/m  as calculated from the following:    Height as of this encounter: 1.689 m (5' 6.5\").    Weight as of this encounter: 82.6 kg (182 lb).   Weight management plan: Discussed healthy diet and exercise guidelines           Return in about 6 months (around 4/28/2021) for Routine preventive.    Ingris Triana PA-C  [facility]    "

## 2020-10-28 NOTE — NURSING NOTE
"55 year old  Chief Complaint   Patient presents with     Thyroid Problem     recheck thyroid labs      Ear Problem     left ear, swishing noise        Blood pressure 136/87, pulse 89, temperature 97.3  F (36.3  C), temperature source Skin, resp. rate 16, height 1.689 m (5' 6.5\"), weight 82.6 kg (182 lb), SpO2 99 %, not currently breastfeeding. Body mass index is 28.94 kg/m .  Patient Active Problem List   Diagnosis     Presbyopia - Both Eyes     Screening for cervical cancer     Celiac disease     Hypothyroidism     Bursitis of left hip       Wt Readings from Last 2 Encounters:   10/28/20 82.6 kg (182 lb)   04/21/18 83.1 kg (183 lb 4 oz)     BP Readings from Last 3 Encounters:   10/28/20 136/87   04/21/18 132/81   06/17/17 136/90         Current Outpatient Medications   Medication     celecoxib (CELEBREX) 200 MG capsule     levothyroxine (SYNTHROID/LEVOTHROID) 112 MCG tablet     norgestrel-ethinyl estradiol (LO/OVRAL, 28,) 0.3-30 MG-MCG per tablet     No current facility-administered medications for this visit.        Social History     Tobacco Use     Smoking status: Never Smoker     Smokeless tobacco: Never Used   Substance Use Topics     Alcohol use: Yes     Alcohol/week: 2.0 standard drinks     Types: 2 Glasses of wine per week     Comment: Social alcohol intake     Drug use: No       Health Maintenance Due   Topic Date Due     COLORECTAL CANCER SCREENING  04/25/1977     HEPATITIS C SCREENING  04/25/1985     ZOSTER IMMUNIZATION (1 of 2) 04/25/2017     PREVENTIVE CARE VISIT  12/12/2017     LIPID  11/26/2018     MAMMO SCREENING  12/20/2019       Lab Results   Component Value Date    PAP NIL 12/12/2016 October 28, 2020 11:33 AM    "

## 2020-10-29 DIAGNOSIS — E03.9 ACQUIRED HYPOTHYROIDISM: Primary | ICD-10-CM

## 2020-11-12 ENCOUNTER — MYC REFILL (OUTPATIENT)
Dept: FAMILY MEDICINE | Facility: CLINIC | Age: 55
End: 2020-11-12

## 2020-11-12 DIAGNOSIS — N95.1 SYMPTOMATIC MENOPAUSAL OR FEMALE CLIMACTERIC STATES: ICD-10-CM

## 2020-11-13 NOTE — TELEPHONE ENCOUNTER
Called and LVM - medication not filled, it's been 3 years since filled and if pt is having symptoms she will need an appointment. Call back to schedule or to speak to clinic nurse.     Pia Madden RN  11/13/20  4:23 PM

## 2020-11-13 NOTE — TELEPHONE ENCOUNTER
Last Office Visit: 10/28/20  [place] [facility] [place]ointments: NONE  Medication last refilled: 2/13/2017    Routing refill request to provider for review/approval because: Fatuma, do you want to fill this?     Pia Madden RN  11/13/20  2:06 PM

## 2020-11-13 NOTE — TELEPHONE ENCOUNTER
Menstrual status is listed as menopausal and has not been prescribed for quite sometime.   Declined to fill.  Needs appointment to discuss. If she is experiencing bleeding after not having a period for awhile she should be seen in person for a pelvic exam.  Thanks Jasper Triana PA-C

## 2020-12-28 DIAGNOSIS — E03.9 ACQUIRED HYPOTHYROIDISM: ICD-10-CM

## 2020-12-28 LAB
T4 FREE SERPL-MCNC: 1.12 NG/DL (ref 0.76–1.46)
TSH SERPL DL<=0.005 MIU/L-ACNC: 6.28 MU/L (ref 0.4–4)

## 2021-01-04 ENCOUNTER — MYC MEDICAL ADVICE (OUTPATIENT)
Dept: FAMILY MEDICINE | Facility: CLINIC | Age: 56
End: 2021-01-04

## 2021-01-04 DIAGNOSIS — E03.9 ACQUIRED HYPOTHYROIDISM: ICD-10-CM

## 2021-01-04 RX ORDER — LEVOTHYROXINE SODIUM 125 UG/1
125 TABLET ORAL DAILY
Qty: 90 TABLET | Refills: 0 | Status: SHIPPED | OUTPATIENT
Start: 2021-01-04 | End: 2021-03-09

## 2021-01-19 ENCOUNTER — OFFICE VISIT (OUTPATIENT)
Dept: FAMILY MEDICINE | Facility: CLINIC | Age: 56
End: 2021-01-19
Payer: COMMERCIAL

## 2021-01-19 VITALS
BODY MASS INDEX: 30.13 KG/M2 | RESPIRATION RATE: 14 BRPM | OXYGEN SATURATION: 99 % | HEIGHT: 66 IN | TEMPERATURE: 97.1 F | SYSTOLIC BLOOD PRESSURE: 127 MMHG | WEIGHT: 187.5 LBS | DIASTOLIC BLOOD PRESSURE: 83 MMHG | HEART RATE: 96 BPM

## 2021-01-19 DIAGNOSIS — R39.15 URINARY URGENCY: ICD-10-CM

## 2021-01-19 DIAGNOSIS — N32.9 BLADDER PROBLEM: Primary | ICD-10-CM

## 2021-01-19 DIAGNOSIS — R35.0 URINARY FREQUENCY: ICD-10-CM

## 2021-01-19 DIAGNOSIS — R35.1 NOCTURIA: ICD-10-CM

## 2021-01-19 DIAGNOSIS — N39.41 URGENCY INCONTINENCE: ICD-10-CM

## 2021-01-19 LAB
BILIRUBIN UR: NEGATIVE MG/DL
BLOOD UR: ABNORMAL MG/DL
GLUCOSE URINE: NEGATIVE
KETONES UR QL: NEGATIVE MG/DL
LEUKOCYTE ESTERASE UR: NEGATIVE
NITRITE UR QL STRIP: NEGATIVE MG/DL
PH UR STRIP: 6.5 [PH] (ref 4.5–8)
PROTEIN UR: NEGATIVE MG/DL
SP GR UR STRIP: 1 (ref 1–1)
UROBILINOGEN UR STRIP-ACNC: ABNORMAL E.U./DL

## 2021-01-19 ASSESSMENT — MIFFLIN-ST. JEOR: SCORE: 1480.11

## 2021-01-19 NOTE — PROGRESS NOTES
Assessment & Plan       ICD-10-CM    1. Bladder problem  N32.9 Urinalysis ([place])   2. Urinary frequency  R35.0 UROLOGY ADULT REFERRAL     Urine Culture Aerobic Bacterial   3. Nocturia  R35.1 UROLOGY ADULT REFERRAL     Urine Culture Aerobic Bacterial   4. Urinary urgency  R39.15 UROLOGY ADULT REFERRAL     Urine Culture Aerobic Bacterial   5. Urgency incontinence  N39.41 UROLOGY ADULT REFERRAL     UC pending.  Likely urgency incontinence but could be mixed. Information given. Recommend bladder diary.  Limit caffeine.  Hand out on bladder training given. See patient instructions.  Uro/gyn or urology referral for further evaluations and recommend treatment.    She will be due for CPE in 3/2021.       20 minutes spent on the date of the encounter doing chart review, interpretation of tests, patient visit and documentation       Return for Routine preventive visit due 3/2021..    Ingris Triana PA-C  [facility]    Subjective     Paradise is a 55 year old who presents to clinic today for the following health issues    HPI     Worsening urinary symptoms that have been worsening over the past 2-3 years.She became quite concerned when she had an episode where she was out walking in the cold and when she went to the car she was flooded with urine and not able to stop it. Additionally she describes at least once nightly nocturia.  She often has a small amount of leaking urine throughout the day and describes coming home after work a frequent trigger. . In the past week she has noted an increase in frequency.  She can urinate 10 or more times daily.    She drinks 2 cups of coffee daily and the coffee definitely triggers to urinate more frequently.  She denies dysuria, back pain and fever.  No history of kidney stones or recurrent  UTI.    Patient's last menstrual period was 12/01/2020. She is still on OCP.  She stopped them 2-3 years ago and had uncontrolled periods and so she restarted.     Review of Systems  "  Constitutional, HEENT, cardiovascular, pulmonary, gi and gu systems are negative, except as otherwise noted.      Objective    /83   Pulse 96   Temp 97.1  F (36.2  C)   Resp 14   Ht 1.689 m (5' 6.5\")   Wt 85 kg (187 lb 8 oz)   LMP 12/01/2020   SpO2 99%   Breastfeeding No   BMI 29.81 kg/m    Body mass index is 29.81 kg/m .  Physical Exam   GENERAL: healthy, alert and no distress  NECK: no adenopathy, no asymmetry, masses, or scars and thyroid normal to palpation  RESP: lungs clear to auscultation - no rales, rhonchi or wheezes  CV: regular rate and rhythm, normal S1 S2, no S3 or S4, no murmur, click or rub, no peripheral edema and peripheral pulses strong  ABDOMEN: soft, nontender, no hepatosplenomegaly, no masses and bowel sounds normal  SKIN: no suspicious lesions or rashes  BACK: no CVA tenderness, no paralumbar tenderness    Results for orders placed or performed in visit on 01/19/21   Urinalysis ([place])     Status: Abnormal   Result Value Ref Range    Leukocyte Esterase UR Negative Negative    Nitrite Urine Negative Negative mg/dL    Protein UR Negative Negative mg/dL    Glucose Urine Negative Negative    Ketones Urine Negative Negative mg/dL    Urobilinogen mg/dL 0.2 E.U./dL 0.2 E.U./dL E.U./dL    Bilirubin UR Negative Negative mg/dL    Blood UR Trace-intact (A) Negative mg/dL    pH Urine 6.5 4.5 - 8.0    Specific Gravity Urine 1.0 1.0 - 1.0       "

## 2021-01-19 NOTE — PATIENT INSTRUCTIONS
Patient Education     Overactive Bladder Syndrome (OAB)     Normally, urine stays in the bladder until a person decides to release it. With OAB, the bladder muscles contract involuntarily, causing a sudden urge to urinate and even urine leakage.   When the bladder muscles squeeze (contract) involuntarily, it is called overactive bladder syndrome (OAB). This causes an intense urge to urinate, called urgency. Urgency can occur many times during the day and night. If urine leaks with the urgency, it is called urge incontinence.  A disease that affects the bladder nerves, such as multiple sclerosis, can cause overactive bladder syndrome. Other conditions can also lead to OAB. These include urinary tract infection (UTI) or prostate problems in men. But the exact cause is often not known.  How is overactive bladder syndrome diagnosed?  Your healthcare provider will examine you and ask about your symptoms and health history. You may also have 1 or more of these tests:    Urine test. Urine samples are taken and checked for problems.    Urinary diary. You record how much fluid you take in and urinate out for 3 days.    Bladder ultrasound. This studies the bladder as it empties. Ultrasound uses sound waves to make detailed images of the inside of the body.    Cystoscopy. This test lets the provider look for problems in the urinary tract. The test uses a thin, flexible scope (cystoscope) with a light and camera on the end. The scope is put into the tube that takes urine out of the body (urethra).    Urodynamic studies. This is a group of tests. They measure and record many aspects of urinary bladder function. This includes pressures, volume, and urine flow.  How is overactive bladder syndrome treated?  Treatment depends on the cause and severity of your OAB. Treatments may include:    Changing your urination habits. Your healthcare provider may advise you to urinate as soon as you feel the urge. You may also need to limit  how much fluid you have during the day.    Exercising your pelvic muscles. This can help strengthen muscles used during urination. These exercises are called Kegels. They include ioana as if you were stopping your urine stream. And tightening your rectum as if trying not to pass gas. Your provider can help you learn how to do Kegels.    Biofeedback. This can help you learn to control the movement of your bladder muscles. Sensors are placed on your belly. They turn signals given off by your muscles into lines on a computer screen.    Medicine. This may be given to relax the bladder muscle. Medicine can also help ease bladder contractions. This reduces the urge to urinate.    Neuromodulation. This may be done if medicine and behavioral changes don t work. Electrical pulses are sent to the nerves that affect the pelvic area (sacral nerves). These pulses help relieve OAB and urge incontinence.    Botulinum toxin shots. These can be injected into the muscle of the bladder to relax the muscles.    Surgery. When less invasive treatments don't work, surgery to make the bladder larger may be done in severe cases.  With treatment, OAB can be managed. A condition, such as UTI, that has led to your OAB will be treated. Treatment may include taking medicine for months or years. You may also need to make changes in your daily routine. This may include going to the bathroom more often than you think you need to. Or you may need to limit caffeine and alcohol because these can make symptoms worse. Your healthcare provider can tell you more.  When to call your healthcare provider  Call the healthcare provider right away if you have any of these:    Fever of 100.4 F (38.0 C) or higher, or as directed by your provider    Symptoms don't get better, or get worse with treatment    Trouble urinating because of pain    Back or belly pain  [email or web address] last reviewed this educational content on 6/1/2019 2000-2020 [employer],  [employer]. [address] [place] [id number]. All rights reserved. This information is not intended as a substitute for professional medical care. Always follow your healthcare professional's instructions.           Patient Education     Treating Incontinence in Women: Nonsurgical Methods     The best treatment for you will depend on the type of incontinence you have. Your symptoms, age, and any underlying problems that are found also affect your treatment. Some types of incontinence may over time require surgery. But nonsurgical treatments may be effective in many cases. Nonsurgical treatments include lifestyle changes, muscle-strengthening exercises, and medicines.   Nonsurgical treatments  Treatment for stress urinary incontinence includes:     Bladder training    Lifestyle changes such as weight loss and increased activity if incontinence is due to being overweight    Medicines, if bladder training has not helped    Pelvic floor muscle exercises  Lifestyle changes    Losing weight. Excess weight puts extra pressure on the pelvic floor muscles. Exercising and eating right can help you lose weight. This helps other treatments work better.    Making certain diet changes. Some foods may make you need to urinate more, so it may be good not to have them. These include caffeinated drinks and alcohol. Ask your healthcare provider if these or other diet changes might be helpful.    Quitting smoking. Smoking can lead to a long-term (chronic) cough that strains pelvic floor muscles. Smoking may also damage the bladder and urethra.    Pelvic floor muscle exercises  There are exercises you can do to help strengthen your pelvic floor muscles. The pelvic floor muscles act as a sling to help hold the bladder and urethra in place. These muscles also help keep the urethra closed. Weak pelvic floor muscles may allow urine to leak. To strengthen the pelvic floor muscles, do the exercises daily. In a few months, the muscles will  be stronger and tighter. This can help prevent urine leakage.   [employer] last reviewed this educational content on 9/1/2019 2000-2020 [employer], [employer]. [address] [facility], [place] [address]. All rights reserved. This information is not intended as a substitute for professional medical care. Always follow your healthcare professional's instructions.

## 2021-01-20 LAB
BACTERIA SPEC CULT: NO GROWTH
Lab: NORMAL
SPECIMEN SOURCE: NORMAL

## 2021-01-27 ENCOUNTER — VIRTUAL VISIT (OUTPATIENT)
Dept: UROLOGY | Facility: CLINIC | Age: 56
End: 2021-01-27
Attending: OBSTETRICS & GYNECOLOGY
Payer: COMMERCIAL

## 2021-01-27 DIAGNOSIS — N39.41 URGENCY INCONTINENCE: ICD-10-CM

## 2021-01-27 DIAGNOSIS — R35.1 NOCTURIA: ICD-10-CM

## 2021-01-27 DIAGNOSIS — R39.15 URINARY URGENCY: ICD-10-CM

## 2021-01-27 DIAGNOSIS — R35.0 URINARY FREQUENCY: ICD-10-CM

## 2021-01-27 PROCEDURE — 99202 OFFICE O/P NEW SF 15 MIN: CPT | Mod: 95 | Performed by: OBSTETRICS & GYNECOLOGY

## 2021-01-27 NOTE — PROGRESS NOTES
Paradise is a 55 year old who is being evaluated via a billable video visit.      How would you like to obtain your AVS? MyChart  If the video visit is dropped, the invitation should be resent by: Text to cell phone: [phone]   Will anyone else be joining your video visit? No      Video Start Time: 9:33 AM    January 27, 2021    Referring Provider: Ingris Triana PA-C  [address] [place] [address]    Primary Care Provider: Ingris Triana    CC: urinary urgency and frequency    HPI:  Paradise Michael is a 55 year old female who presents for evaluation of her pelvic floor symptoms.  She has recent onset of urinary urgency, frequency and incontinence. She will 10+/day with 2-3 episodes of nocturia. She recently had an episode where she lost most of her bladder volume with an accident while on a walk and not near a bathroom. Has attempted to reduce her caffeine and water intake with only minor improvement in her symptoms     Prolapse:  Do you feel a vaginal bulge? no                                      Pressure? no   Do you have to place your fingers in the vagina or in the rectum to have a bowel movement? no  Impact to quality of life? -     Stress Incontinence:  Do you leak urine with cough, sneeze, exercise? no  How often do you leak with cough, sneeze, exercise?  -  How much do you usually leak? -   Do you wear a pad? - If so; -  Impact to quality of life? -    Urge Incontinence:  Do you often get sudden urges to urinate? yes  How often do have urges? daily  If so, do you leak with these urges? yes  How much do you usually leak? Can lose large amount  Impact to quality of life? moderate    Voids/day:10  Nocturia: 2  Fluid intake: approximately 72 ounces/day  Caffeine: decreased from 2 to 1 cup    Urinating:  Difficulty starting urination or strain to void? no  Weak or intermittent stream? no  Incomplete emptying or dribbling? no  Pain or burning with urination? no  Any blood in your  urine? no    GI:  Constipation? no  Frequency stools 2/day    Straining for stools no  Stool consistency normal     Ever leak stool (Accidental Bowel Leakage)? no      If so, how often?               -      If so, do you leak?                   -      Soiling without sensation? -  History of irritable bowel or Crohn's? No, has celiac     Sexual/Pain:  Are you currently having sex?. no  Pain with sex?   -   Sexual Partner: -  Do any of these symptoms interfere with sex? -  Impact to quality of life? -    Prior therapy:  Ever done pelvic floor physical therapy? no  Trial of medication? no  Have you ever tried a pessary? no    Medical History:  Do you have?   High Cholesterol? no     Diabetes? no  High Blood pressure? no     Recurrent UTIs? no  Sleep Apnea? no  Other medical problems: thyroid, celiac    Surgical History:    Hysterectomy? no   Bladder Surgery? no   Other? no     OB/Gyn History:  Pregnancies? 0  Deliveries? 0  Vaginal 0  Section 0  Current birth control? On OCPs  Periods? Q 3 months  When was the first day of your last period? -  Last Pap smear? UTD Any abnormal? HPV  Last mammogram? UTD  Last colonoscopy? UTD    Medications/Vitamins/Supplements: levothyroxine, celebrex    Drug Allergies: no    Latex Allergy: no  Iodine Allergy no    Family History: (list relationship and age at diagnosis)  Breast cancer? no   Ovarian cancer? no   Colon cancer? no  Other? no    Social History:  Marital status: -  Do you/ have you ever smoke(d)  cigarettes? no  Drink more than 1 alcoholic beverage a day?  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 no                   Unusual headaches? no  Leg pain w/ walking/exercise? no                       Easy bruising? no  Difficulty breathing w/ walking/exercise? no  Problems with vision? no  Dizziness, falls, or fainting? no  Excessive bleeding from cuts, gums,  surgery? no  Other: no    Past Medical History:   Diagnosis Date     Abnormal Pap smear      Anemia      Bursitis of hip Right hip, twice     Celiac disease      Delayed menarche 15 yrs old     Hypothyroidism      Plantar fasciitis     Resolved       Past Surgical History:   Procedure Laterality Date     COLONOSCOPY       COLPOSCOPY CERVIX, LOOP ELECTRODE BIOPSY, COMBINED  1992       Social History     Socioeconomic History     Marital status: Single     Spouse name: Not on file     Number of children: 0     Years of education: College     Highest education level: Not on file   Occupational History     Occupation: Works for a [employer]     Employer: [employer]   Social Needs     Financial resource strain: Not on file     Food insecurity     Worry: Not on file     Inability: Not on file     Transportation needs     Medical: Not on file     Non-medical: Not on file   Tobacco Use     Smoking status: Never Smoker     Smokeless tobacco: Never Used   Substance and Sexual Activity     Alcohol use: Yes     Alcohol/week: 2.0 standard drinks     Types: 2 Glasses of wine per week     Comment: Social alcohol intake     Drug use: No     Sexual activity: Not Currently     Partners: Male     Birth control/protection: Pill   Lifestyle     Physical activity     Days per week: Not on file     Minutes per session: Not on file     Stress: Not on file   Relationships     Social connections     Talks on phone: Not on file     Gets together: Not on file     Attends Denominational service: Not on file     Active member of club or organization: Not on file     Attends meetings of clubs or organizations: Not on file     Relationship status: Not on file     Intimate partner violence     Fear of current or ex partner: Not on file     Emotionally abused: Not on file     Physically abused: Not on file     Forced sexual activity: Not on file   Other Topics Concern      Service No     Blood Transfusions Yes     Comment: Anemia 1997      Caffeine Concern No     Comment: 2-3 cups of coffee a day     Occupational Exposure Yes     Hobby Hazards No     Comment: Enjoys aqua classes, swimming, biking, walking, movies     Sleep Concern No     Comment: Wakes up several times a night, gets 6 hrs of sleep a night     Stress Concern No     Comment: Stress job, exercise to cope     Weight Concern Yes     Comment: Related to thyroid issues     Special Diet Yes     Comment: Vegetarian 4 years, gluten free     Back Care Yes     Comment: No diagnosis, has done PT     Exercise No     Comment: Walks, swimming, yoga, exercises 3-4 times a week for 1 hr each time     Bike Helmet Yes     Seat Belt Yes     Self-Exams Yes     Comment: breast   Social History Narrative    How much exercise per week? 3 days    How much calcium per day? 3 servings       How much caffeine per day? 2 cups coffee    How much vitamin D per day? 2000 iu    Do you/your family wear seatbelts?  Yes    Do you/your family use safety helmets? Yes    Do you/your family use sunscreen? Yes    Do you/your family keep firearms in the home? No    Do you/your family have a smoke detector(s)? Yes        Do you feel safe in your home? Yes    Has anyone ever touched you in an unwanted manner? No     Explain         November 18, 2014 Yaakov Cruz LPN        Relational: Previously , no children        Living situation: Previously was living in [place], moved to [place]        Support network: Close to friends and family       Family History   Problem Relation Age of Onset     Cerebrovascular Disease Maternal Grandfather      Diabetes Maternal Grandfather      Hypertension Mother      Endocrine Disease Mother         hypothyroidism     Thyroid Disease Mother      Hyperlipidemia Mother      Arthritis Mother      Cerebrovascular Disease Paternal Grandfather      Heart Surgery Father         CABG and stent placement     Arthritis Father      Cerebrovascular Disease Maternal Grandmother      Celiac Disease Sister       Anemia Sister      Thyroid Disease Sister        ROS    Allergies   Allergen Reactions     Gluten      Wheat        Current Outpatient Medications   Medication     celecoxib (CELEBREX) 200 MG capsule     levothyroxine (SYNTHROID/LEVOTHROID) 125 MCG tablet     norgestrel-ethinyl estradiol (LO/OVRAL, 28,) 0.3-30 MG-MCG per tablet     No current facility-administered medications for this visit.        There were no vitals taken for this visit. No LMP recorded. (Menstrual status: Birth Control). There is no height or weight on file to calculate BMI.  Ms. Michael is alert, comfortable in no acute distress, non-labored breathing.     A/P: Paradise Michael is a 55 year old F with urgency, frequency and urge incontinence    We discussed the diagnosis and treatment options. Pt has urgency and urge incontinence. Treatment options to include:  1) observation with f/u in 6 -12 months  2) pelvic floor physical therapy and bladder training  3) anti-cholinergic medications or myrbetriq  4) BOTOX injections  5) Interstim    Pt would prefer to attempt PFPT first. Referral to [facility] placed. F/U in 2-3 months      A total of 20 minutes were spent with the patient today, > 50% in counseling and coordination of care    Brennen Narvaez MD  Professor, OB/GYN  Urogynecologist  CC  Patient Care Team:  Ingris Triana PA-C as PCP - General (Family Practice)  Yenni Holden MD as MD (Internal Medicine)  Leticia Frazier APRN CNP as Nurse Practitioner (Nurse Practitioner)  Elly Snow MD as MD (Family Practice)  Brennen Narvaez MD as MD (OB/Gyn)  Ingris Triana PA-C as Referring Physician (Family Medicine)  INGRIS TRIANA                Video-Visit Details    Type of service:  Video Visit    Video End Time:9:55 AM    Originating Location (pt. Location): Home    Distant Location (provider location):  [facility]     Platform used for Video Visit: GeoEye

## 2021-02-15 ENCOUNTER — THERAPY VISIT (OUTPATIENT)
Dept: PHYSICAL THERAPY | Facility: CLINIC | Age: 56
End: 2021-02-15
Attending: OBSTETRICS & GYNECOLOGY
Payer: COMMERCIAL

## 2021-02-15 DIAGNOSIS — N39.41 URGENCY INCONTINENCE: ICD-10-CM

## 2021-02-15 DIAGNOSIS — R35.0 URINARY FREQUENCY: ICD-10-CM

## 2021-02-15 DIAGNOSIS — R35.1 NOCTURIA: ICD-10-CM

## 2021-02-15 DIAGNOSIS — M99.05 SOMATIC DYSFUNCTION OF PELVIS REGION: ICD-10-CM

## 2021-02-15 DIAGNOSIS — N39.41 URGE INCONTINENCE: ICD-10-CM

## 2021-02-15 DIAGNOSIS — R39.15 URINARY URGENCY: ICD-10-CM

## 2021-02-15 PROCEDURE — 97535 SELF CARE MNGMENT TRAINING: CPT | Mod: GP | Performed by: PHYSICAL THERAPIST

## 2021-02-15 PROCEDURE — 97161 PT EVAL LOW COMPLEX 20 MIN: CPT | Mod: GP | Performed by: PHYSICAL THERAPIST

## 2021-02-15 PROCEDURE — 97112 NEUROMUSCULAR REEDUCATION: CPT | Mod: GP | Performed by: PHYSICAL THERAPIST

## 2021-02-15 PROCEDURE — 97110 THERAPEUTIC EXERCISES: CPT | Mod: GP | Performed by: PHYSICAL THERAPIST

## 2021-02-15 NOTE — PROGRESS NOTES
[facility] Initial Evaluation  Subjective:  The history is provided by the patient. No  was used.   Patient Health History         Pain is reported as 0/10 on pain scale.  General health as reported by patient is good.  Pertinent medical history includes: changes in bowel/bladder and thyroid problems.     Medical allergies: other. Other medical allergies details: Wheat, gluten.       Current medications:  Anti-inflammatory and thyroid medication.    Current occupation is Editorial work.   Primary job tasks include:  Computer work.                  Therapist Generated HPI Evaluation  Problem details: Has had some more episodes of leaking recently at home and [place]  Can walk for 1/2 hour.  Works full time from home.  Doing some weights and yoga.  Up 1-2 times at night.  Troubled with urgency and frequency the most.  Cold weather triggers as does being at the door.  No history of pelvic surgery.  Has left hip pain.  Taking Celebrex and had cortisone injection  .         Type of problem:  Incontinence.    This is a new condition.  Condition occurred with:  Insidious onset.  Where condition occurred: in the community and at home.  Patient reports pain:  N/a.      Since onset symptoms are gradually worsening.  Exacerbated by: cold weather.  and relieved by nothing.      Restrictions due to condition include:  Working in normal job without restrictions.  Barriers include:  None as reported by patient.                        Objective:  System                                 Pelvic Dysfunction Evaluation:    Bladder/Pelvic Problems:    Storage Problem:  Frequency and urgency        Diagnostic Tests:  none                        Flexibility:  normal      Abdominal Wall:  normal        Pelvic Clock Exam:  normal            SI Provocation:  NA        Reflex Testing:  NA    External Assessment:    Skin Condition:  Normal    Bearing  Down/Coughing:  NA      Muscle Contraction/Perineal Mobility:  Slight lift, no urogential triangle descent  Internal Assessment:    Sensory Exam:  Normal  Contraction/Grade:  Fair squeeze, definite lift (3)  Accessory Muscle use-Abdominals:  X        Additional History:      Number of Live Births: 0  Caffeine Consumption:  2-3 cups in the morning                     General     ROS    Assessment/Plan:    Patient is a 55 year old female with pelvic complaints.    Patient has the following significant findings with corresponding treatment plan.                Diagnosis 1:  Urge incontinence  Decreased strength - therapeutic exercise, therapeutic activities and home program  Impaired muscle performance - biofeedback, neuro re-education and home program  Decreased function - therapeutic activities and home program    Therapy Evaluation Codes:   1) History comprised of:   Personal factors that impact the plan of care:      None.    Comorbidity factors that impact the plan of care are:      None.     Medications impacting care: None.  2) Examination of Body Systems comprised of:   Body structures and functions that impact the plan of care:      Pelvis.   Activity limitations that impact the plan of care are:      Frequency, Urgency and Urge incontinence.  3) Clinical presentation characteristics are:   Stable/Uncomplicated.  4) Decision-Making    Low complexity using standardized patient assessment instrument and/or measureable assessment of functional outcome.  Cumulative Therapy Evaluation is: Low complexity.    Previous and current functional limitations:  (See Goal Flow Sheet for this information)  Patient appears to be able to clearly communicate and understand verbal and written communication and follow directions correctly.  Short term and Long term goals: (See Goal Flow Sheet for this information)     Communication ability:    Treatment Explanation - The following has been discussed with the patient:   RX  ordered/plan of care  Anticipated outcomes  Possible risks and side effects  This patient would benefit from PT intervention to resume normal activities.   Rehab potential is excellent.    Frequency:  1 X a month, once daily  Duration:  for 2 months  Discharge Plan:  Independent in home treatment program.  Reach maximal therapeutic benefit.    Please refer to the daily flowsheet for treatment today, total treatment time and time spent performing 1:1 timed codes.

## 2021-03-05 ASSESSMENT — ANXIETY QUESTIONNAIRES
5. BEING SO RESTLESS THAT IT IS HARD TO SIT STILL: NOT AT ALL
2. NOT BEING ABLE TO STOP OR CONTROL WORRYING: NOT AT ALL
6. BECOMING EASILY ANNOYED OR IRRITABLE: NOT AT ALL
3. WORRYING TOO MUCH ABOUT DIFFERENT THINGS: NOT AT ALL
GAD7 TOTAL SCORE: 0
4. TROUBLE RELAXING: NOT AT ALL
7. FEELING AFRAID AS IF SOMETHING AWFUL MIGHT HAPPEN: NOT AT ALL
7. FEELING AFRAID AS IF SOMETHING AWFUL MIGHT HAPPEN: NOT AT ALL
1. FEELING NERVOUS, ANXIOUS, OR ON EDGE: NOT AT ALL
GAD7 TOTAL SCORE: 0

## 2021-03-05 ASSESSMENT — ENCOUNTER SYMPTOMS
FLANK PAIN: 0
HEMATURIA: 0
DIFFICULTY URINATING: 0
DYSURIA: 0

## 2021-03-06 ASSESSMENT — ANXIETY QUESTIONNAIRES: GAD7 TOTAL SCORE: 0

## 2021-03-08 ENCOUNTER — OFFICE VISIT (OUTPATIENT)
Dept: OBGYN | Facility: CLINIC | Age: 56
End: 2021-03-08
Attending: ADVANCED PRACTICE MIDWIFE
Payer: COMMERCIAL

## 2021-03-08 ENCOUNTER — ANCILLARY PROCEDURE (OUTPATIENT)
Dept: MAMMOGRAPHY | Facility: CLINIC | Age: 56
End: 2021-03-08
Attending: ADVANCED PRACTICE MIDWIFE
Payer: COMMERCIAL

## 2021-03-08 VITALS
HEIGHT: 67 IN | DIASTOLIC BLOOD PRESSURE: 91 MMHG | BODY MASS INDEX: 29.71 KG/M2 | WEIGHT: 189.3 LBS | SYSTOLIC BLOOD PRESSURE: 145 MMHG | HEART RATE: 81 BPM

## 2021-03-08 DIAGNOSIS — Z12.4 SCREENING FOR MALIGNANT NEOPLASM OF CERVIX: ICD-10-CM

## 2021-03-08 DIAGNOSIS — Z12.31 VISIT FOR SCREENING MAMMOGRAM: ICD-10-CM

## 2021-03-08 DIAGNOSIS — Z01.419 ENCOUNTER FOR GYNECOLOGICAL EXAMINATION WITHOUT ABNORMAL FINDING: ICD-10-CM

## 2021-03-08 DIAGNOSIS — Z00.00 VISIT FOR PREVENTIVE HEALTH EXAMINATION: Primary | ICD-10-CM

## 2021-03-08 DIAGNOSIS — E03.9 ACQUIRED HYPOTHYROIDISM: ICD-10-CM

## 2021-03-08 LAB
DEPRECATED CALCIDIOL+CALCIFEROL SERPL-MC: 30 UG/L (ref 20–75)
ERYTHROCYTE [DISTWIDTH] IN BLOOD BY AUTOMATED COUNT: 12.3 % (ref 10–15)
HBA1C MFR BLD: 5.2 % (ref 0–5.6)
HCT VFR BLD AUTO: 42 % (ref 35–47)
HCV AB SERPL QL IA: NONREACTIVE
HGB BLD-MCNC: 14.3 G/DL (ref 11.7–15.7)
MCH RBC QN AUTO: 30.7 PG (ref 26.5–33)
MCHC RBC AUTO-ENTMCNC: 34 G/DL (ref 31.5–36.5)
MCV RBC AUTO: 90 FL (ref 78–100)
PLATELET # BLD AUTO: 301 10E9/L (ref 150–450)
RBC # BLD AUTO: 4.66 10E12/L (ref 3.8–5.2)
TSH SERPL DL<=0.005 MIU/L-ACNC: 3 MU/L (ref 0.4–4)
WBC # BLD AUTO: 8.4 10E9/L (ref 4–11)

## 2021-03-08 PROCEDURE — 77067 SCR MAMMO BI INCL CAD: CPT | Mod: 26 | Performed by: RADIOLOGY

## 2021-03-08 PROCEDURE — 86803 HEPATITIS C AB TEST: CPT | Performed by: ADVANCED PRACTICE MIDWIFE

## 2021-03-08 PROCEDURE — 87624 HPV HI-RISK TYP POOLED RSLT: CPT | Performed by: ADVANCED PRACTICE MIDWIFE

## 2021-03-08 PROCEDURE — 82306 VITAMIN D 25 HYDROXY: CPT | Performed by: ADVANCED PRACTICE MIDWIFE

## 2021-03-08 PROCEDURE — 85027 COMPLETE CBC AUTOMATED: CPT | Performed by: ADVANCED PRACTICE MIDWIFE

## 2021-03-08 PROCEDURE — 84443 ASSAY THYROID STIM HORMONE: CPT | Performed by: PHYSICIAN ASSISTANT

## 2021-03-08 PROCEDURE — 36415 COLL VENOUS BLD VENIPUNCTURE: CPT | Performed by: PHYSICIAN ASSISTANT

## 2021-03-08 PROCEDURE — 83036 HEMOGLOBIN GLYCOSYLATED A1C: CPT | Performed by: ADVANCED PRACTICE MIDWIFE

## 2021-03-08 PROCEDURE — G0463 HOSPITAL OUTPT CLINIC VISIT: HCPCS

## 2021-03-08 PROCEDURE — 77067 SCR MAMMO BI INCL CAD: CPT

## 2021-03-08 PROCEDURE — 99386 PREV VISIT NEW AGE 40-64: CPT | Performed by: ADVANCED PRACTICE MIDWIFE

## 2021-03-08 PROCEDURE — G0145 SCR C/V CYTO,THINLAYER,RESCR: HCPCS | Performed by: ADVANCED PRACTICE MIDWIFE

## 2021-03-08 SDOH — HEALTH STABILITY: MENTAL HEALTH: HOW OFTEN DO YOU HAVE 6 OR MORE DRINKS ON ONE OCCASION?: NEVER

## 2021-03-08 SDOH — HEALTH STABILITY: MENTAL HEALTH: HOW MANY STANDARD DRINKS CONTAINING ALCOHOL DO YOU HAVE ON A TYPICAL DAY?: 1 OR 2

## 2021-03-08 SDOH — HEALTH STABILITY: MENTAL HEALTH: HOW OFTEN DO YOU HAVE A DRINK CONTAINING ALCOHOL?: 2-3 TIMES A WEEK

## 2021-03-08 ASSESSMENT — MIFFLIN-ST. JEOR: SCORE: 1488.35

## 2021-03-08 ASSESSMENT — ENCOUNTER SYMPTOMS
SPUTUM PRODUCTION: 0
BREAST PAIN: 0
TACHYCARDIA: 0
INCREASED ENERGY: 0
BOWEL INCONTINENCE: 0
NERVOUS/ANXIOUS: 0
RESPIRATORY PAIN: 0
SINUS CONGESTION: 0
DECREASED CONCENTRATION: 0
WEIGHT GAIN: 0
PANIC: 0
NIGHT SWEATS: 0
SMELL DISTURBANCE: 0
EYE WATERING: 0
LIGHT-HEADEDNESS: 0
HALLUCINATIONS: 0
ORTHOPNEA: 0
NECK MASS: 0
SLEEP DISTURBANCES DUE TO BREATHING: 0
JOINT SWELLING: 0
HEADACHES: 0
SHORTNESS OF BREATH: 0
LEG SWELLING: 0
DIFFICULTY URINATING: 0
ALTERED TEMPERATURE REGULATION: 0
SYNCOPE: 0
POLYDIPSIA: 0
HOARSE VOICE: 0
SKIN CHANGES: 0
VOMITING: 0
NUMBNESS: 0
MUSCLE WEAKNESS: 0
DIZZINESS: 0
WHEEZING: 0
STIFFNESS: 0
DYSPNEA ON EXERTION: 0
EXERCISE INTOLERANCE: 0
EYE REDNESS: 0
TROUBLE SWALLOWING: 0
LOSS OF CONSCIOUSNESS: 0
DISTURBANCES IN COORDINATION: 0
FATIGUE: 0
DYSURIA: 0
BACK PAIN: 0
HEMATURIA: 0
SINUS PAIN: 0
BRUISES/BLEEDS EASILY: 0
ABDOMINAL PAIN: 0
ARTHRALGIAS: 0
COUGH DISTURBING SLEEP: 0
DECREASED LIBIDO: 0
HOT FLASHES: 0
MUSCLE CRAMPS: 0
NAUSEA: 0
EYE IRRITATION: 0
FEVER: 0
TINGLING: 0
EXTREMITY NUMBNESS: 0
NAIL CHANGES: 0
LEG PAIN: 0
POOR WOUND HEALING: 0
POLYPHAGIA: 0
RECTAL PAIN: 0
HEMOPTYSIS: 0
SWOLLEN GLANDS: 0
SPEECH CHANGE: 0
HYPERTENSION: 0
CLAUDICATION: 0
WEAKNESS: 0
EYE PAIN: 0
BLOOD IN STOOL: 0
TASTE DISTURBANCE: 0
PARALYSIS: 0
NECK PAIN: 0
RECTAL BLEEDING: 0
DEPRESSION: 0
DECREASED APPETITE: 0
WEIGHT LOSS: 0
CHILLS: 0
TREMORS: 0
HEARTBURN: 0
DOUBLE VISION: 0
COUGH: 0
SNORES LOUDLY: 0
BREAST MASS: 0
MYALGIAS: 0
SEIZURES: 0
JAUNDICE: 0
FLANK PAIN: 0
INSOMNIA: 0
CONSTIPATION: 0
POSTURAL DYSPNEA: 0
PALPITATIONS: 0
BLOATING: 0
DIARRHEA: 0
HYPOTENSION: 0
MEMORY LOSS: 0
SORE THROAT: 0

## 2021-03-08 ASSESSMENT — PAIN SCALES - GENERAL: PAINLEVEL: NO PAIN (0)

## 2021-03-08 NOTE — PROGRESS NOTES
Progress Note    SUBJECTIVE:  Paradise Michael is an 55 year old, , who requests an Annual Preventive Exam.     Concerns today include:   - Elevated HTN. According to patient, this is the first time her BP has been elevated. Denies HA, vision changes, sob, dizziness, chest pain.   - Bladder incontinence: worsened last month and she met with a urogynecologist. Started PT, seen once and seeing again next week.  - Occasional dizziness that she attributes to thyroid. Has diagnosis of hypothyroidism. Occurs sporadically- notes this usually means that she needs to change thyroid medications. Occurred again a couple weeks ago after dosage change. She is due to have her TSH rechecked since the dose change.    Currently on Lo/Ocral 0.3- 30.   Menstrual History: LMP: 3 weeks ago; periods every 3 months due to OCP. Light periods. Tried discontinuing OCP in  and didn't get period for 6 months, then experienced heavy and painful periods. TVUS was completed with normal findings and she restarted the OCP.  - Thinks she is interested in discontinuing again and monitoring bleeding.     Pap History  Last pap was  - records are being sent. Per patient, NILM and HPV neg. First negative HPV result since . Reports that previous provider recommended that she have pap repeated in one year, if normal return to 5 year intervals.  HPV + for previous paps.  18/14, NILM, other HR HPV +  /10/15, NILM, other HR HPV +  12/12/16, NILM, other HR HPV +  2017, NILM, +other  Colposcopy normal     Sexual History: not currently sexual active, no sexual concerns. Declined need for STI testing today.  Menopause symptoms: no reported symptoms  Mental Health: doing okay, no history of anxiety or depression.    Social History: working remotely for company in [email or web address]   Diet: gluten free diet, primarily vegetarian.   Exercise: tries to exercise every day over lunch hour; walking, strength training and workouts at home. Gets  30-45 minutes per day.    Mammogram: completed today    Lipids checked 10/28/20 - WNL    TSH regularly checked, history of hypothyroidism, tx with levothyroxine. Increased dose a couple months ago. Plan in place to have labs rechecked.    Colon cancer screening: colonoscopy ~ 2018 due to blood in stool. Unsure of what they recommended for follow up. She plans to follow up with provider     Diabetes screening: due    Immunizations: flu received 10/1/20, Tdap UTD, shingrix due. Wants to delay starting until after COVID vaccine.    Menstrual History:  Menstrual History 8/19/2016 4/21/2018 1/19/2021   LAST MENSTRUAL PERIOD 8/11/2016 4/2/2018 12/1/2020   Menarche Age - - -   Period Cycle (Days) - - -   Period Duration (Days) - - -   Method of Contraception - - -   Period Pattern - - -   Menstrual Flow - - -   Menstrual Control - - -   Dysmenorrhea - - -   PMS Symptoms - - -   Reviewed Today - - -       Last    Lab Results   Component Value Date    PAP NIL 12/12/2016     History of abnormal Pap smear: YES - updated in Problem List and Health Maintenance accordingly    Last   Lab Results   Component Value Date    HPV16 Negative 12/12/2016     Last   Lab Results   Component Value Date    HPV18 Negative 12/12/2016     Last   Lab Results   Component Value Date    HRHPV Positive 12/12/2016       Mammogram current: yes   Last Mammogram: completed today (3/8/21)  Ma Screening Digital Bilateral    Result Date: 12/21/2017  Narrative: Examination: Bilateral digital screening mammography with computer aided detection and History: No symptoms, routine screening. No personal or family history of breast cancer. Comparison: Mammogram 12/15/2016, 12/10/2015, 11/18/2014, 11/14/2013 BREAST DENSITY: Heterogeneously dense. Findings: No significant change.        Last Colonoscopy: According to patient, last colonoscopy around 2018 due to blood in stool.  Will contact provider for when next one is due.      HISTORY:  norgestrel-ethinyl  estradiol (LO/OVRAL, 28,) 0.3-30 MG-MCG per tablet, Take only active pills, no placebos for 3 months then 1 week of the placebo for a withdrawal bleed.  celecoxib (CELEBREX) 200 MG capsule,     No current facility-administered medications on file prior to visit.     Allergies   Allergen Reactions     Gluten      Wheat      Immunization History   Administered Date(s) Administered     COVID-19,PF,Pfizer 2021     Influenza,INJ,MDCK,PF,Quad >4yrs 10/01/2020     TDAP Vaccine (Boostrix) 2016       OB History    Para Term  AB Living   0 0 0 0 0 0   SAB TAB Ectopic Multiple Live Births   0 0 0 0 0     Past Medical History:   Diagnosis Date     Abnormal Pap smear      Anemia      Bursitis of hip Right hip, twice     Celiac disease      Delayed menarche 15 yrs old     Hypothyroidism      Past Surgical History:   Procedure Laterality Date     COLONOSCOPY       COLPOSCOPY      2019     COLPOSCOPY CERVIX, LOOP ELECTRODE BIOPSY, COMBINED       Family History   Problem Relation Age of Onset     Cerebrovascular Disease Maternal Grandfather      Diabetes Maternal Grandfather      Hypertension Mother      Endocrine Disease Mother         hypothyroidism     Thyroid Disease Mother      Hyperlipidemia Mother      Arthritis Mother      Cancer Mother         Bone marrow cancer     Cerebrovascular Disease Paternal Grandfather      Heart Surgery Father         CABG and stent placement     Arthritis Father      Cerebrovascular Disease Maternal Grandmother      Celiac Disease Sister      Anemia Sister      Social History     Socioeconomic History     Marital status: Single     Spouse name: Not on file     Number of children: 0     Years of education: College     Highest education level: Not on file   Occupational History     Occupation: Works for a [employer]     Employer: [employer]   Social Needs     Financial resource strain: Not on file     Food insecurity     Worry: Not on file     Inability: Not  on file     Transportation needs     Medical: Not on file     Non-medical: Not on file   Tobacco Use     Smoking status: Never Smoker     Smokeless tobacco: Never Used   Substance and Sexual Activity     Alcohol use: Yes     Alcohol/week: 2.0 standard drinks     Types: 2 Glasses of wine per week     Comment: Social alcohol intake     Drug use: No     Sexual activity: Not Currently     Partners: Male     Birth control/protection: Pill   Lifestyle     Physical activity     Days per week: Not on file     Minutes per session: Not on file     Stress: Not on file   Relationships     Social connections     Talks on phone: Not on file     Gets together: Not on file     Attends Zoroastrian service: Not on file     Active member of club or organization: Not on file     Attends meetings of clubs or organizations: Not on file     Relationship status: Not on file     Intimate partner violence     Fear of current or ex partner: Not on file     Emotionally abused: Not on file     Physically abused: Not on file     Forced sexual activity: Not on file   Other Topics Concern      Service No     Blood Transfusions Yes     Comment: Anemia 1997     Caffeine Concern No     Comment: 2-3 cups of coffee a day     Occupational Exposure Yes     Hobby Hazards No     Comment: Enjoys aqua classes, swimming, biking, walking, movies     Sleep Concern No     Comment: Wakes up several times a night, gets 6 hrs of sleep a night     Stress Concern No     Comment: Stress job, exercise to cope     Weight Concern Yes     Comment: Related to thyroid issues     Special Diet Yes     Comment: Vegetarian 4 years, gluten free     Back Care Yes     Comment: No diagnosis, has done PT     Exercise No     Comment: Walks, swimming, yoga, exercises 3-4 times a week for 1 hr each time     Bike Helmet Yes     Seat Belt Yes     Self-Exams Yes     Comment: breast   Social History Narrative    How much exercise per week? 3 days    How much calcium per day? 3  servings       How much caffeine per day? 2 cups coffee    How much vitamin D per day? 2000 iu    Do you/your family wear seatbelts?  Yes    Do you/your family use safety helmets? Yes    Do you/your family use sunscreen? Yes    Do you/your family keep firearms in the home? No    Do you/your family have a smoke detector(s)? Yes        Do you feel safe in your home? Yes    Has anyone ever touched you in an unwanted manner? No     Explain         November 18, 2014 Yaakov Cruz LPN        Relational: Previously , no children        Living situation: Previously was living in [place], moved to [place]        Support network: Close to friends and family       Review of Systems     Constitutional:  Negative for fever, chills, weight loss, weight gain, fatigue, decreased appetite, night sweats, recent stressors, height gain, height loss, post-operative complications, incisional pain, hallucinations, increased energy, hyperactivity and confused.   HENT:  Negative for ear pain, hearing loss, tinnitus, nosebleeds, trouble swallowing, hoarse voice, mouth sores, sore throat, ear discharge, tooth pain, gum tenderness, taste disturbance, smell disturbance, hearing aid, bleeding gums, dry mouth, sinus pain, sinus congestion and neck mass.    Eyes:  Negative for double vision, pain, redness, eye pain, decreased vision, eye watering, eye bulging, eye dryness, flashing lights, spots, floaters, strabismus, tunnel vision, jaundice and eye irritation.   Respiratory:   Negative for cough, hemoptysis, sputum production, shortness of breath, wheezing, sleep disturbances due to breathing, snores loudly, respiratory pain, dyspnea on exertion, cough disturbing sleep and postural dyspnea.    Cardiovascular:  Negative for chest pain, dyspnea on exertion, palpitations, orthopnea, claudication, leg swelling, fingers/toes turn blue, hypertension, hypotension, syncope, history of heart murmur, chest pain on exertion, chest pain at rest, pacemaker,  few scattered varicosities, leg pain, sleep disturbances due to breathing, tachycardia, light-headedness, exercise intolerance and edema.   Gastrointestinal:  Negative for heartburn, nausea, vomiting, abdominal pain, diarrhea, constipation, blood in stool, melena, rectal pain, bloating, hemorrhoids, bowel incontinence, jaundice, rectal bleeding, coffee ground emesis and change in stool.   Genitourinary:  Positive for bladder incontinence. Negative for dysuria, urgency, hematuria, flank pain, vaginal discharge, difficulty urinating, genital sores, dyspareunia, decreased libido, nocturia, voiding less frequently, arousal difficulty, abnormal vaginal bleeding, excessive menstruation, menstrual changes, hot flashes, vaginal dryness and postmenopausal bleeding.   Musculoskeletal:  Negative for myalgias, back pain, joint swelling, arthralgias, stiffness, muscle cramps, neck pain, bone pain, muscle weakness and fracture.   Skin:  Negative for nail changes, itching, poor wound healing, rash, hair changes, skin changes, acne, warts, poor wound healing, scarring, flaky skin, Raynaud's phenomenon, sensitivity to sunlight and skin thickening.   Neurological:  Negative for dizziness, tingling, tremors, speech change, seizures, loss of consciousness, weakness, light-headedness, numbness, headaches, disturbances in coordination, extremity numbness, memory loss, difficulty walking and paralysis.   Endo/Heme:  Negative for anemia, swollen glands and bruises/bleeds easily.   Psychiatric/Behavioral:  Negative for depression, hallucinations, memory loss, decreased concentration, mood swings and panic attacks.    Breast:  Negative for breast discharge, breast mass, breast pain and nipple retraction.   Endocrine:  Negative for altered temperature regulation, polyphagia, polydipsia, unwanted hair growth and change in facial hair.    PHQ-9 SCORE 6/16/2016 12/12/2016 4/24/2018   PHQ-9 Total Score - - -   PHQ-9 Total Score 8 0 4     REINA-7  "SCORE 12/12/2016 4/24/2018 3/5/2021   Total Score - - 0 (minimal anxiety)   Total Score 0 2 0         EXAM:  Blood pressure (!) 145/91, pulse 81, height 1.689 m (5' 6.5\"), weight 85.9 kg (189 lb 4.8 oz), not currently breastfeeding. Body mass index is 30.1 kg/m .  General - pleasant female in no acute distress.  Skin - no suspicious lesions or rashes  EENT-  PERRLA, euthyroid with out palpable nodules  Neck - supple without lymphadenopathy.  Lungs - clear to auscultation bilaterally.  Heart - regular rate and rhythm without murmur.  Abdomen - soft, nontender, nondistended, no masses or organomegaly noted.  Musculoskeletal - no gross deformities.  Neurological - normal strength, sensation, and mental status.    Breast Exam:  Breast: Without visible skin changes. No dimpling or lesions seen.   Breasts supple, non-tender with palpation, no dominant mass, nodularity, or nipple discharge noted bilaterally. Axillary nodes negative.      Pelvic Exam:  EG/BUS: Normal genital architecture without lesions, erythema or abnormal secretions Bartholin's, Urethra, [place]'s normal   Urethral meatus: normal   Urethra: no masses, tenderness, or scarring.  Bladder: no masses or tenderness  Vagina: moist, pink, rugae with creamy, white and odorless secretions. Decreased muscle tone.  Cervix: pink, moist, closed, without lesion or CMT. Pap obtained.  Uterus: midposition, and small, smooth, firm, mobile w/o pain  Adnexa: within normal limits. No masses, nodularity, tenderness  Rectum: anus normal       ASSESSMENT:  Encounter Diagnoses   Name Primary?     Visit for preventive health examination Yes     Screening for malignant neoplasm of cervix      Encounter for gynecological examination without abnormal finding         PLAN:   Orders Placed This Encounter   Procedures     Pelvic and Breast Exam Procedure []     Pap Smear Exam [] Do Not Remove     Hemoglobin A1c     Pap imaged thin layer screen with HPV - recommended age 30 - 65 " years (select HPV order below)     HPV High Risk Types DNA Cervical     CBC with Platelets     25- [place]-Vitamin D     Hepatitis C antibody       - Routine preventative health labs added for Hgb A1C and Hep C.  - TSH lab previously ordered from provider managing her hypothyroidism. Patient will complete today.  - CBC and vitamin D added due to reported dizziness.  - Patient will delay starting shingrix vaccine until after she receives COVID vaccine. Educated her on where she can get the vaccine and that she will need to wait until 14 days after her COVID vaccine until she can start the shingrix series.  - Patient will follow up with PCP about high blood pressure.   - Discussed recommendation to either discontinue or reduce the estrogen of her OCP due to age and elevated BP.   Patient plans to discontinue OCP and will follow up if she experiences abnormal bleeding.  If she desires to restart an OCP, we recommended microgestin or a progesterone only option.  - Pap with hpv cotesting obtained today due to history of positive HPV (other HR).   - Patient will follow up on her previous colonoscopy to get results and their recommended follow up. If colonoscopy is due, she was advised that she can send a message to get an order placed.    Additional teaching done at this visit regarding self breast exam, exercise and perimenopause.    Return to clinic in one year.  Follow-up as needed.    I, Sharon Arnold, am serving as a scribe; to document services personally performed by Anay Koehler CNM based on data collection and the provider's statements to me.     Sharon Arnold, BSN, RN  WHNP DNP Student    The encounter was performed by me and scribed by the SNM. The scribed note accurately reflects my personal services and decisions made by me.     MARGO Stokes CNM

## 2021-03-08 NOTE — LETTER
3/8/2021       RE: Paradise Michael  [address]  [facility] [address]     Dear Colleague,    Thank you for referring your patient, Paradise Michael, to the [facility] [place] at [facility]. Please see a copy of my visit note below.        Progress Note    SUBJECTIVE:  Paradise Michael is an 55 year old, , who requests an Annual Preventive Exam.     Concerns today include:   - Elevated HTN. According to patient, this is the first time her BP has been elevated. Denies HA, vision changes, sob, dizziness, chest pain.   - Bladder incontinence: worsened last month and she met with a urogynecologist. Started PT, seen once and seeing again next week.  - Occasional dizziness that she attributes to thyroid. Has diagnosis of hypothyroidism. Occurs sporadically- notes this usually means that she needs to change thyroid medications. Occurred again a couple weeks ago after dosage change. She is due to have her TSH rechecked since the dose change.    Currently on Lo/Ocral 0.3- 30.   Menstrual History: LMP: 3 weeks ago; periods every 3 months due to OCP. Light periods. Tried discontinuing OCP in  and didn't get period for 6 months, then experienced heavy and painful periods. TVUS was completed with normal findings and she restarted the OCP.  - Thinks she is interested in discontinuing again and monitoring bleeding.     Pap History  Last pap was  - records are being sent. Per patient, NILM and HPV neg. First negative HPV result since . Reports that previous provider recommended that she have pap repeated in one year, if normal return to 5 year intervals.  HPV + for previous paps.  /14, NILM, other HR HPV +  12/10/15, NILM, other HR HPV +  /16, NILM, other HR HPV +  , NILM, +other  Colposcopy normal     Sexual History: not currently sexual active, no sexual concerns. Declined need for STI testing today.  Menopause  symptoms: no reported symptoms  Mental Health: doing okay, no history of anxiety or depression.    Social History: working remotely for company in [employer]   Diet: gluten free diet, primarily vegetarian.   Exercise: tries to exercise every day over lunch hour; walking, strength training and workouts at home. Gets 30-45 minutes per day.    Mammogram: completed today    Lipids checked 10/28/20 - WNL    TSH regularly checked, history of hypothyroidism, tx with levothyroxine. Increased dose a couple months ago. Plan in place to have labs rechecked.    Colon cancer screening: colonoscopy ~ 2018 due to blood in stool. Unsure of what they recommended for follow up. She plans to follow up with provider     Diabetes screening: due    Immunizations: flu received 10/1/20, Tdap UTD, shingrix due. Wants to delay starting until after COVID vaccine.    Menstrual History:  Menstrual History 8/19/2016 4/21/2018 1/19/2021   LAST MENSTRUAL PERIOD 8/11/2016 4/2/2018 12/1/2020   Menarche Age - - -   Period Cycle (Days) - - -   Period Duration (Days) - - -   Method of Contraception - - -   Period Pattern - - -   Menstrual Flow - - -   Menstrual Control - - -   Dysmenorrhea - - -   PMS Symptoms - - -   Reviewed Today - - -       Last    Lab Results   Component Value Date    PAP NIL 12/12/2016     History of abnormal Pap smear: YES - updated in Problem List and Health Maintenance accordingly    Last   Lab Results   Component Value Date    HPV16 Negative 12/12/2016     Last   Lab Results   Component Value Date    HPV18 Negative 12/12/2016     Last   Lab Results   Component Value Date    HRHPV Positive 12/12/2016       Mammogram current: yes   Last Mammogram: completed today (3/8/21)  Ma Screening Digital Bilateral    Result Date: 12/21/2017  Narrative: Examination: Bilateral digital screening mammography with computer aided detection and History: No symptoms, routine screening. No personal or family history of breast cancer. Comparison:  Mammogram 12/15/2016, 12/10/2015, 2014, 2013 BREAST DENSITY: Heterogeneously dense. Findings: No significant change.        Last Colonoscopy: According to patient, last colonoscopy around  due to blood in stool.  Will contact provider for when next one is due.      HISTORY:  norgestrel-ethinyl estradiol (LO/OVRAL, 28,) 0.3-30 MG-MCG per tablet, Take only active pills, no placebos for 3 months then 1 week of the placebo for a withdrawal bleed.  celecoxib (CELEBREX) 200 MG capsule,     No current facility-administered medications on file prior to visit.     Allergies   Allergen Reactions     Gluten      Wheat      Immunization History   Administered Date(s) Administered     COVID-19,PF,Pfizer 2021     Influenza,INJ,MDCK,PF,Quad >4yrs 10/01/2020     TDAP Vaccine (Boostrix) 2016       OB History    Para Term  AB Living   0 0 0 0 0 0   SAB TAB Ectopic Multiple Live Births   0 0 0 0 0     Past Medical History:   Diagnosis Date     Abnormal Pap smear      Anemia      Bursitis of hip Right hip, twice     Celiac disease      Delayed menarche 15 yrs old     Hypothyroidism      Past Surgical History:   Procedure Laterality Date     COLONOSCOPY       COLPOSCOPY           COLPOSCOPY CERVIX, LOOP ELECTRODE BIOPSY, COMBINED       Family History   Problem Relation Age of Onset     Cerebrovascular Disease Maternal Grandfather      Diabetes Maternal Grandfather      Hypertension Mother      Endocrine Disease Mother         hypothyroidism     Thyroid Disease Mother      Hyperlipidemia Mother      Arthritis Mother      Cancer Mother         Bone marrow cancer     Cerebrovascular Disease Paternal Grandfather      Heart Surgery Father         CABG and stent placement     Arthritis Father      Cerebrovascular Disease Maternal Grandmother      Celiac Disease Sister      Anemia Sister      Social History     Socioeconomic History     Marital status: Single     Spouse name: Not on file      Number of children: 0     Years of education: College     Highest education level: Not on file   Occupational History     Occupation: Works for a [employer]     Employer: [employer]   Social Needs     Financial resource strain: Not on file     Food insecurity     Worry: Not on file     Inability: Not on file     Transportation needs     Medical: Not on file     Non-medical: Not on file   Tobacco Use     Smoking status: Never Smoker     Smokeless tobacco: Never Used   Substance and Sexual Activity     Alcohol use: Yes     Alcohol/week: 2.0 standard drinks     Types: 2 Glasses of wine per week     Comment: Social alcohol intake     Drug use: No     Sexual activity: Not Currently     Partners: Male     Birth control/protection: Pill   Lifestyle     Physical activity     Days per week: Not on file     Minutes per session: Not on file     Stress: Not on file   Relationships     Social connections     Talks on phone: Not on file     Gets together: Not on file     Attends Holiness service: Not on file     Active member of club or organization: Not on file     Attends meetings of clubs or organizations: Not on file     Relationship status: Not on file     Intimate partner violence     Fear of current or ex partner: Not on file     Emotionally abused: Not on file     Physically abused: Not on file     Forced sexual activity: Not on file   Other Topics Concern      Service No     Blood Transfusions Yes     Comment: Anemia 1997     Caffeine Concern No     Comment: 2-3 cups of coffee a day     Occupational Exposure Yes     Hobby Hazards No     Comment: Enjoys aqua classes, swimming, biking, walking, movies     Sleep Concern No     Comment: Wakes up several times a night, gets 6 hrs of sleep a night     Stress Concern No     Comment: Stress job, exercise to cope     Weight Concern Yes     Comment: Related to thyroid issues     Special Diet Yes     Comment: Vegetarian 4 years, gluten free     Back Care Yes      Comment: No diagnosis, has done PT     Exercise No     Comment: Walks, swimming, yoga, exercises 3-4 times a week for 1 hr each time     Bike Helmet Yes     Seat Belt Yes     Self-Exams Yes     Comment: breast   Social History Narrative    How much exercise per week? 3 days    How much calcium per day? 3 servings       How much caffeine per day? 2 cups coffee    How much vitamin D per day? 2000 iu    Do you/your family wear seatbelts?  Yes    Do you/your family use safety helmets? Yes    Do you/your family use sunscreen? Yes    Do you/your family keep firearms in the home? No    Do you/your family have a smoke detector(s)? Yes        Do you feel safe in your home? Yes    Has anyone ever touched you in an unwanted manner? No     Explain         November 18, 2014 Yaakov Cruz LPN        Relational: Previously , no children        Living situation: Previously was living in [place], moved to [place]        Support network: Close to friends and family       Review of Systems     Constitutional:  Negative for fever, chills, weight loss, weight gain, fatigue, decreased appetite, night sweats, recent stressors, height gain, height loss, post-operative complications, incisional pain, hallucinations, increased energy, hyperactivity and confused.   HENT:  Negative for ear pain, hearing loss, tinnitus, nosebleeds, trouble swallowing, hoarse voice, mouth sores, sore throat, ear discharge, tooth pain, gum tenderness, taste disturbance, smell disturbance, hearing aid, bleeding gums, dry mouth, sinus pain, sinus congestion and neck mass.    Eyes:  Negative for double vision, pain, redness, eye pain, decreased vision, eye watering, eye bulging, eye dryness, flashing lights, spots, floaters, strabismus, tunnel vision, jaundice and eye irritation.   Respiratory:   Negative for cough, hemoptysis, sputum production, shortness of breath, wheezing, sleep disturbances due to breathing, snores loudly, respiratory pain, dyspnea on exertion,  cough disturbing sleep and postural dyspnea.    Cardiovascular:  Negative for chest pain, dyspnea on exertion, palpitations, orthopnea, claudication, leg swelling, fingers/toes turn blue, hypertension, hypotension, syncope, history of heart murmur, chest pain on exertion, chest pain at rest, pacemaker, few scattered varicosities, leg pain, sleep disturbances due to breathing, tachycardia, light-headedness, exercise intolerance and edema.   Gastrointestinal:  Negative for heartburn, nausea, vomiting, abdominal pain, diarrhea, constipation, blood in stool, melena, rectal pain, bloating, hemorrhoids, bowel incontinence, jaundice, rectal bleeding, coffee ground emesis and change in stool.   Genitourinary:  Positive for bladder incontinence. Negative for dysuria, urgency, hematuria, flank pain, vaginal discharge, difficulty urinating, genital sores, dyspareunia, decreased libido, nocturia, voiding less frequently, arousal difficulty, abnormal vaginal bleeding, excessive menstruation, menstrual changes, hot flashes, vaginal dryness and postmenopausal bleeding.   Musculoskeletal:  Negative for myalgias, back pain, joint swelling, arthralgias, stiffness, muscle cramps, neck pain, bone pain, muscle weakness and fracture.   Skin:  Negative for nail changes, itching, poor wound healing, rash, hair changes, skin changes, acne, warts, poor wound healing, scarring, flaky skin, Raynaud's phenomenon, sensitivity to sunlight and skin thickening.   Neurological:  Negative for dizziness, tingling, tremors, speech change, seizures, loss of consciousness, weakness, light-headedness, numbness, headaches, disturbances in coordination, extremity numbness, memory loss, difficulty walking and paralysis.   Endo/Heme:  Negative for anemia, swollen glands and bruises/bleeds easily.   Psychiatric/Behavioral:  Negative for depression, hallucinations, memory loss, decreased concentration, mood swings and panic attacks.    Breast:  Negative for  "breast discharge, breast mass, breast pain and nipple retraction.   Endocrine:  Negative for altered temperature regulation, polyphagia, polydipsia, unwanted hair growth and change in facial hair.    PHQ-9 SCORE 6/16/2016 12/12/2016 4/24/2018   PHQ-9 Total Score - - -   PHQ-9 Total Score 8 0 4     REINA-7 SCORE 12/12/2016 4/24/2018 3/5/2021   Total Score - - 0 (minimal anxiety)   Total Score 0 2 0         EXAM:  Blood pressure (!) 145/91, pulse 81, height 1.689 m (5' 6.5\"), weight 85.9 kg (189 lb 4.8 oz), not currently breastfeeding. Body mass index is 30.1 kg/m .  General - pleasant female in no acute distress.  Skin - no suspicious lesions or rashes  EENT-  PERRLA, euthyroid with out palpable nodules  Neck - supple without lymphadenopathy.  Lungs - clear to auscultation bilaterally.  Heart - regular rate and rhythm without murmur.  Abdomen - soft, nontender, nondistended, no masses or organomegaly noted.  Musculoskeletal - no gross deformities.  Neurological - normal strength, sensation, and mental status.    Breast Exam:  Breast: Without visible skin changes. No dimpling or lesions seen.   Breasts supple, non-tender with palpation, no dominant mass, nodularity, or nipple discharge noted bilaterally. Axillary nodes negative.      Pelvic Exam:  EG/BUS: Normal genital architecture without lesions, erythema or abnormal secretions Bartholin's, Urethra, [place]'s normal   Urethral meatus: normal   Urethra: no masses, tenderness, or scarring.  Bladder: no masses or tenderness  Vagina: moist, pink, rugae with creamy, white and odorless secretions. Decreased muscle tone.  Cervix: pink, moist, closed, without lesion or CMT. Pap obtained.  Uterus: midposition, and small, smooth, firm, mobile w/o pain  Adnexa: within normal limits. No masses, nodularity, tenderness  Rectum: anus normal       ASSESSMENT:  Encounter Diagnoses   Name Primary?     Visit for preventive health examination Yes     Screening for malignant neoplasm of " cervix      Encounter for gynecological examination without abnormal finding         PLAN:   Orders Placed This Encounter   Procedures     Pelvic and Breast Exam Procedure []     Pap Smear Exam [] Do Not Remove     Hemoglobin A1c     Pap imaged thin layer screen with HPV - recommended age 30 - 65 years (select HPV order below)     HPV High Risk Types DNA Cervical     CBC with Platelets     25- OH-Vitamin D     Hepatitis C antibody       - Routine preventative health labs added for Hgb A1C and Hep C.  - TSH lab previously ordered from provider managing her hypothyroidism. Patient will complete today.  - CBC and vitamin D added due to reported dizziness.  - Patient will delay starting shingrix vaccine until after she receives COVID vaccine. Educated her on where she can get the vaccine and that she will need to wait until 14 days after her COVID vaccine until she can start the shingrix series.  - Patient will follow up with PCP about high blood pressure.   - Discussed recommendation to either discontinue or reduce the estrogen of her OCP due to age and elevated BP.   Patient plans to discontinue OCP and will follow up if she experiences abnormal bleeding.  If she desires to restart an OCP, we recommended microgestin or a progesterone only option.  - Pap with hpv cotesting obtained today due to history of positive HPV (other HR).   - Patient will follow up on her previous colonoscopy to get results and their recommended follow up. If colonoscopy is due, she was advised that she can send a message to get an order placed.    Additional teaching done at this visit regarding self breast exam, exercise and perimenopause.    Return to clinic in one year.  Follow-up as needed.    ISharon, am serving as a scribe; to document services personally performed by Anay Koehler CNM based on data collection and the provider's statements to me.     Sharon Arnold, BSN, RN  WHNP DNP Student    The encounter  was performed by me and scribed by the SNM. The scribed note accurately reflects my personal services and decisions made by me.     MARGO Stokes, JUSTINM

## 2021-03-08 NOTE — PATIENT INSTRUCTIONS

## 2021-03-09 DIAGNOSIS — E03.9 ACQUIRED HYPOTHYROIDISM: ICD-10-CM

## 2021-03-09 RX ORDER — LEVOTHYROXINE SODIUM 125 UG/1
125 TABLET ORAL DAILY
Qty: 90 TABLET | Refills: 3 | Status: SHIPPED | OUTPATIENT
Start: 2021-03-09 | End: 2022-04-01

## 2021-03-11 LAB
COPATH REPORT: NORMAL
PAP: NORMAL

## 2021-03-15 ENCOUNTER — THERAPY VISIT (OUTPATIENT)
Dept: PHYSICAL THERAPY | Facility: CLINIC | Age: 56
End: 2021-03-15
Payer: COMMERCIAL

## 2021-03-15 DIAGNOSIS — M99.05 SOMATIC DYSFUNCTION OF PELVIS REGION: ICD-10-CM

## 2021-03-15 DIAGNOSIS — N39.41 URGE INCONTINENCE: ICD-10-CM

## 2021-03-15 LAB
FINAL DIAGNOSIS: NORMAL
HPV HR 12 DNA CVX QL NAA+PROBE: NEGATIVE
HPV16 DNA SPEC QL NAA+PROBE: NEGATIVE
HPV18 DNA SPEC QL NAA+PROBE: NEGATIVE
SPECIMEN DESCRIPTION: NORMAL
SPECIMEN SOURCE CVX/VAG CYTO: NORMAL

## 2021-03-15 PROCEDURE — 97535 SELF CARE MNGMENT TRAINING: CPT | Mod: GP | Performed by: PHYSICAL THERAPIST

## 2021-03-15 PROCEDURE — 97112 NEUROMUSCULAR REEDUCATION: CPT | Mod: GP | Performed by: PHYSICAL THERAPIST

## 2021-03-15 PROCEDURE — 97110 THERAPEUTIC EXERCISES: CPT | Mod: GP | Performed by: PHYSICAL THERAPIST

## 2021-03-15 NOTE — PROGRESS NOTES
Subjective:  HPI  Physical Exam                    Objective:  System    Physical Exam    General     ROS    Assessment/Plan:    DISCHARGE REPORT    Progress reporting period is from 2/15/2021 to 3/15/2021.       SUBJECTIVE  Subjective changes noted by patient:  .  Subjective: [place].  Did bladder diary.  Not getting the urgency as much at the door.  Has still been working from home.      Current pain level is NA  .     Previous pain level was  NA  .   Changes in function:  Yes (See Goal flowsheet attached for changes in current functional level)  Adverse reaction to treatment or activity: None    OBJECTIVE  Changes noted in objective findings:  Yes,   Objective: Reviewed bladder diary.  Normal fluid intake.  Add some relaxation exercises.  Modified hip strentgthnening to avoid pain.       ASSESSMENT/PLAN  Updated problem list and treatment plan: Diagnosis 1:  Urinary urgency  Decreased strength - therapeutic exercise, therapeutic activities and home program  Impaired muscle performance - biofeedback, neuro re-education and home program  Decreased function - therapeutic activities and home program  STG/LTGs have been met or progress has been made towards goals:  Yes (See Goal flow sheet completed today.)  Assessment of Progress: The patient's condition is improving.  The patient's condition has potential to improve.  Self Management Plans:  Patient has been instructed in a home treatment program.    Paradise continues to require the following intervention to meet STG and LTG's:  Patient needs to continue to work on the home exercise program.      Recommendations:  This patient is ready to be discharged from therapy and continue their home treatment program.    Please refer to the daily flowsheet for treatment today, total treatment time and time spent performing 1:1 timed codes.

## 2021-03-18 ENCOUNTER — IMMUNIZATION (OUTPATIENT)
Dept: NURSING | Facility: CLINIC | Age: 56
End: 2021-03-18
Payer: COMMERCIAL

## 2021-03-18 PROCEDURE — 91300 PR COVID VAC PFIZER DIL RECON 30 MCG/0.3 ML IM: CPT

## 2021-03-18 PROCEDURE — 0001A PR COVID VAC PFIZER DIL RECON 30 MCG/0.3 ML IM: CPT

## 2021-04-08 ENCOUNTER — IMMUNIZATION (OUTPATIENT)
Dept: NURSING | Facility: CLINIC | Age: 56
End: 2021-04-08
Attending: INTERNAL MEDICINE
Payer: COMMERCIAL

## 2021-04-08 PROCEDURE — 91300 PR COVID VAC PFIZER DIL RECON 30 MCG/0.3 ML IM: CPT

## 2021-04-08 PROCEDURE — 0002A PR COVID VAC PFIZER DIL RECON 30 MCG/0.3 ML IM: CPT

## 2021-10-02 ENCOUNTER — HEALTH MAINTENANCE LETTER (OUTPATIENT)
Age: 56
End: 2021-10-02

## 2022-03-28 ASSESSMENT — ANXIETY QUESTIONNAIRES
6. BECOMING EASILY ANNOYED OR IRRITABLE: NOT AT ALL
3. WORRYING TOO MUCH ABOUT DIFFERENT THINGS: NOT AT ALL
7. FEELING AFRAID AS IF SOMETHING AWFUL MIGHT HAPPEN: NOT AT ALL
1. FEELING NERVOUS, ANXIOUS, OR ON EDGE: NOT AT ALL
4. TROUBLE RELAXING: NOT AT ALL
2. NOT BEING ABLE TO STOP OR CONTROL WORRYING: NOT AT ALL
GAD7 TOTAL SCORE: 0
5. BEING SO RESTLESS THAT IT IS HARD TO SIT STILL: NOT AT ALL

## 2022-03-29 ASSESSMENT — ANXIETY QUESTIONNAIRES
7. FEELING AFRAID AS IF SOMETHING AWFUL MIGHT HAPPEN: NOT AT ALL
GAD7 TOTAL SCORE: 0
GAD7 TOTAL SCORE: 0

## 2022-03-31 DIAGNOSIS — E03.9 ACQUIRED HYPOTHYROIDISM: ICD-10-CM

## 2022-04-01 RX ORDER — LEVOTHYROXINE SODIUM 125 UG/1
125 TABLET ORAL DAILY
Qty: 30 TABLET | Refills: 0 | Status: SHIPPED | OUTPATIENT
Start: 2022-04-01 | End: 2022-04-18

## 2022-04-01 NOTE — TELEPHONE ENCOUNTER
Medication requested: levothyroxine (SYNTHROID/LEVOTHROID) 125 MCG tablet  Last office visit: 1/19/21  [facility] [place] appointments: none  Medication last refilled: 3/9/21; 90 + 3 refills  Last qualifying labs:   Component      Latest Ref Rng & Units 3/8/2021   TSH      0.40 - 4.00 mU/L 3.00     Medication is being filled for 1 time refill only due to:  Patient needs labs : TSH. Patient needs to be seen because it has been more than one year since last visit.    Will send pt [employer] message to schedule appt with Dr. Mena.    Aristides ÁLVAREZ, RN  04/01/22 11:07 AM

## 2022-04-12 ENCOUNTER — ANCILLARY PROCEDURE (OUTPATIENT)
Dept: MAMMOGRAPHY | Facility: CLINIC | Age: 57
End: 2022-04-12
Attending: NURSE PRACTITIONER
Payer: COMMERCIAL

## 2022-04-12 ENCOUNTER — OFFICE VISIT (OUTPATIENT)
Dept: OBGYN | Facility: CLINIC | Age: 57
End: 2022-04-12
Attending: ADVANCED PRACTICE MIDWIFE
Payer: COMMERCIAL

## 2022-04-12 ENCOUNTER — LAB (OUTPATIENT)
Dept: LAB | Facility: CLINIC | Age: 57
End: 2022-04-12
Attending: ADVANCED PRACTICE MIDWIFE
Payer: COMMERCIAL

## 2022-04-12 VITALS
HEIGHT: 66 IN | WEIGHT: 171.3 LBS | SYSTOLIC BLOOD PRESSURE: 119 MMHG | BODY MASS INDEX: 27.53 KG/M2 | HEART RATE: 96 BPM | DIASTOLIC BLOOD PRESSURE: 81 MMHG

## 2022-04-12 DIAGNOSIS — Z01.419 WOMEN'S ANNUAL ROUTINE GYNECOLOGICAL EXAMINATION: Primary | ICD-10-CM

## 2022-04-12 DIAGNOSIS — Z12.4 SCREENING FOR MALIGNANT NEOPLASM OF CERVIX: ICD-10-CM

## 2022-04-12 DIAGNOSIS — Z12.31 VISIT FOR SCREENING MAMMOGRAM: ICD-10-CM

## 2022-04-12 DIAGNOSIS — E03.9 ACQUIRED HYPOTHYROIDISM: ICD-10-CM

## 2022-04-12 LAB
T4 FREE SERPL-MCNC: 1.34 NG/DL (ref 0.76–1.46)
TSH SERPL DL<=0.005 MIU/L-ACNC: 0.02 MU/L (ref 0.4–4)

## 2022-04-12 PROCEDURE — 77067 SCR MAMMO BI INCL CAD: CPT

## 2022-04-12 PROCEDURE — 87624 HPV HI-RISK TYP POOLED RSLT: CPT | Performed by: ADVANCED PRACTICE MIDWIFE

## 2022-04-12 PROCEDURE — 84443 ASSAY THYROID STIM HORMONE: CPT

## 2022-04-12 PROCEDURE — G0145 SCR C/V CYTO,THINLAYER,RESCR: HCPCS | Performed by: ADVANCED PRACTICE MIDWIFE

## 2022-04-12 PROCEDURE — G0463 HOSPITAL OUTPT CLINIC VISIT: HCPCS

## 2022-04-12 PROCEDURE — 77067 SCR MAMMO BI INCL CAD: CPT | Mod: 26 | Performed by: STUDENT IN AN ORGANIZED HEALTH CARE EDUCATION/TRAINING PROGRAM

## 2022-04-12 PROCEDURE — 99396 PREV VISIT EST AGE 40-64: CPT | Performed by: ADVANCED PRACTICE MIDWIFE

## 2022-04-12 PROCEDURE — 77063 BREAST TOMOSYNTHESIS BI: CPT | Mod: 26 | Performed by: STUDENT IN AN ORGANIZED HEALTH CARE EDUCATION/TRAINING PROGRAM

## 2022-04-12 PROCEDURE — 84439 ASSAY OF FREE THYROXINE: CPT

## 2022-04-12 PROCEDURE — 36415 COLL VENOUS BLD VENIPUNCTURE: CPT

## 2022-04-12 RX ORDER — FAMOTIDINE 20 MG
TABLET ORAL
COMMUNITY
End: 2022-07-27

## 2022-04-12 RX ORDER — LEVOTHYROXINE SODIUM 125 UG/1
125 TABLET ORAL DAILY
Qty: 30 TABLET | Refills: 11 | Status: SHIPPED | OUTPATIENT
Start: 2022-04-12 | End: 2022-04-18

## 2022-04-12 ASSESSMENT — ANXIETY QUESTIONNAIRES
2. NOT BEING ABLE TO STOP OR CONTROL WORRYING: NOT AT ALL
1. FEELING NERVOUS, ANXIOUS, OR ON EDGE: NOT AT ALL
5. BEING SO RESTLESS THAT IT IS HARD TO SIT STILL: NOT AT ALL
3. WORRYING TOO MUCH ABOUT DIFFERENT THINGS: SEVERAL DAYS
7. FEELING AFRAID AS IF SOMETHING AWFUL MIGHT HAPPEN: NOT AT ALL
6. BECOMING EASILY ANNOYED OR IRRITABLE: SEVERAL DAYS
GAD7 TOTAL SCORE: 2

## 2022-04-12 ASSESSMENT — ENCOUNTER SYMPTOMS
DIZZINESS: 0
HOT FLASHES: 0
LEG PAIN: 0
RECTAL PAIN: 0
FATIGUE: 0
ARTHRALGIAS: 0
BLOOD IN STOOL: 0
NIGHT SWEATS: 0
SWOLLEN GLANDS: 0
POLYPHAGIA: 0
JAUNDICE: 0
MUSCLE WEAKNESS: 0
SPEECH CHANGE: 0
NECK MASS: 0
NECK PAIN: 0
LIGHT-HEADEDNESS: 0
DISTURBANCES IN COORDINATION: 0
STIFFNESS: 0
PARALYSIS: 0
EYE WATERING: 0
DECREASED LIBIDO: 0
EXERCISE INTOLERANCE: 0
WEAKNESS: 0
FLANK PAIN: 0
ORTHOPNEA: 0
EYE REDNESS: 0
MYALGIAS: 0
HYPOTENSION: 0
PANIC: 0
TINGLING: 0
INCREASED ENERGY: 0
SINUS PAIN: 0
NUMBNESS: 0
DECREASED APPETITE: 0
JOINT SWELLING: 0
PALPITATIONS: 0
INSOMNIA: 0
WEIGHT LOSS: 0
VOMITING: 0
BOWEL INCONTINENCE: 0
DECREASED CONCENTRATION: 0
SPUTUM PRODUCTION: 0
NAUSEA: 0
MUSCLE CRAMPS: 0
SLEEP DISTURBANCES DUE TO BREATHING: 0
SMELL DISTURBANCE: 0
TASTE DISTURBANCE: 0
BLOATING: 0
HYPERTENSION: 0
SNORES LOUDLY: 0
EYE IRRITATION: 0
ALTERED TEMPERATURE REGULATION: 0
SINUS CONGESTION: 0
SYNCOPE: 0
BACK PAIN: 0
SKIN CHANGES: 0
HEMOPTYSIS: 0
DOUBLE VISION: 0
RECTAL BLEEDING: 0
LEG SWELLING: 0
FEVER: 0
COUGH DISTURBING SLEEP: 0
BREAST MASS: 0
HEARTBURN: 0
EXTREMITY NUMBNESS: 0
SHORTNESS OF BREATH: 0
HOARSE VOICE: 0
NAIL CHANGES: 0
DYSURIA: 0
COUGH: 0
EYE PAIN: 0
LOSS OF CONSCIOUSNESS: 0
TACHYCARDIA: 0
DEPRESSION: 0
ABDOMINAL PAIN: 0
RESPIRATORY PAIN: 0
WHEEZING: 0
DYSPNEA ON EXERTION: 0
BREAST PAIN: 0
NERVOUS/ANXIOUS: 0
POOR WOUND HEALING: 0
CHILLS: 0
POLYDIPSIA: 0
POSTURAL DYSPNEA: 0
MEMORY LOSS: 0
HALLUCINATIONS: 0
TROUBLE SWALLOWING: 0
SEIZURES: 0
BRUISES/BLEEDS EASILY: 0
SORE THROAT: 0
CLAUDICATION: 0
HEADACHES: 0
DIARRHEA: 0
TREMORS: 0
HEMATURIA: 0
DIFFICULTY URINATING: 0
WEIGHT GAIN: 0
CONSTIPATION: 0

## 2022-04-12 ASSESSMENT — PATIENT HEALTH QUESTIONNAIRE - PHQ9
5. POOR APPETITE OR OVEREATING: NOT AT ALL
SUM OF ALL RESPONSES TO PHQ QUESTIONS 1-9: 2

## 2022-04-12 NOTE — PROGRESS NOTES
Progress Note    SUBJECTIVE:  Paradise Michael is an 56 year old, , who requests an Annual Preventive Exam.       Concerns today include: Hx of abnormal pap smears (HPV positive last in ).  Would like pap repeated today.  Reviewed ASCCP guidelines, next pap due in 3 years.    Would like refill on Synthroid and would like TSH checked.    Menstrual History:  Menstrual History 2018   LAST MENSTRUAL PERIOD 2018   Menarche Age - - -   Period Cycle (Days) - - -   Period Duration (Days) - - -   Method of Contraception - - -   Period Pattern - - -   Menstrual Flow - - -   Menstrual Control - - -   Dysmenorrhea - - -   PMS Symptoms - - -   Reviewed Today - - -       Last    Lab Results   Component Value Date    PAP NIL 2021     History of abnormal Pap smear: HPV positive last in     Last   Lab Results   Component Value Date    HPV16 Negative 2021     Last   Lab Results   Component Value Date    HPV18 Negative 2021     Last   Lab Results   Component Value Date    HRHPV Negative 2021       Mammogram current: yes and not applicable  Last Mammogram:   MA Screening Digital Bilateral    Result Date: 3/9/2021  Narrative: Examination: Bilateral digital screening mammography with computer aided detection. Comparison: 2017, 12/15/2016, 12/10/2015 History/family history: No symptoms, routine screening. BREAST DENSITY: Heterogeneously dense. COMMENTS: There has been no significant change.        Last Colonoscopy: current per patient        HISTORY:  levothyroxine (SYNTHROID/LEVOTHROID) 125 MCG tablet, Take 1 tablet (125 mcg) by mouth daily  Vitamin D, Cholecalciferol, 25 MCG (1000 UT) CAPS,     No current facility-administered medications on file prior to visit.    Allergies   Allergen Reactions     Gluten      Wheat      Immunization History   Administered Date(s) Administered     COVID-19,MIKE,Pfizer (12+ Yrs) 2021, 2021      Influenza,INJ,MDCK,PF,Quad >4yrs 10/01/2020     TDAP Vaccine (Boostrix) 2016       OB History    Para Term  AB Living   0 0 0 0 0 0   SAB IAB Ectopic Multiple Live Births   0 0 0 0 0     Past Medical History:   Diagnosis Date     Abnormal Pap smear      Anemia      Bursitis of hip Right hip, twice     Celiac disease      Delayed menarche 15 yrs old     Hypothyroidism      Past Surgical History:   Procedure Laterality Date     COLONOSCOPY       COLPOSCOPY           COLPOSCOPY CERVIX, LOOP ELECTRODE BIOPSY, COMBINED       Family History   Problem Relation Age of Onset     Cerebrovascular Disease Maternal Grandfather      Diabetes Maternal Grandfather      Hypertension Mother      Endocrine Disease Mother         hypothyroidism     Thyroid Disease Mother      Hyperlipidemia Mother      Arthritis Mother      Cancer Mother         Bone marrow cancer     Cerebrovascular Disease Paternal Grandfather      Heart Surgery Father         CABG and stent placement     Arthritis Father      Cerebrovascular Disease Maternal Grandmother      Celiac Disease Sister      Anemia Sister      Social History     Socioeconomic History     Marital status: Single     Spouse name: None     Number of children: 0     Years of education: College     Highest education level: None   Occupational History     Occupation: Works for a [employer]     Employer: [employer]   Tobacco Use     Smoking status: Never Smoker     Smokeless tobacco: Never Used   Substance and Sexual Activity     Alcohol use: Yes     Alcohol/week: 2.0 standard drinks     Comment: Social alcohol intake     Drug use: No     Sexual activity: Not Currently     Partners: Male     Birth control/protection: Pill   Other Topics Concern      Service No     Blood Transfusions Yes     Comment: Anemia      Caffeine Concern No     Comment: 2-3 cups of coffee a day     Occupational Exposure Yes     Hobby Hazards No     Comment: Enjoys aqua  classes, swimming, biking, walking, movies     Sleep Concern No     Comment: Wakes up several times a night, gets 6 hrs of sleep a night     Stress Concern No     Comment: Stress job, exercise to cope     Weight Concern Yes     Comment: Related to thyroid issues     Special Diet Yes     Comment: Vegetarian 4 years, gluten free     Back Care Yes     Comment: No diagnosis, has done PT     Exercise No     Comment: Walks, swimming, yoga, exercises 3-4 times a week for 1 hr each time     Bike Helmet Yes     Seat Belt Yes     Self-Exams Yes     Comment: breast   Social History Narrative    How much exercise per week? 3 days    How much calcium per day? 3 servings       How much caffeine per day? 2 cups coffee    How much vitamin D per day? 2000 iu    Do you/your family wear seatbelts?  Yes    Do you/your family use safety helmets? Yes    Do you/your family use sunscreen? Yes    Do you/your family keep firearms in the home? No    Do you/your family have a smoke detector(s)? Yes        Do you feel safe in your home? Yes    Has anyone ever touched you in an unwanted manner? No     Explain         November 18, 2014 Yaakov Cruz LPN        Relational: Previously , no children        Living situation: Previously was living in [place], moved to [place]        Support network: Close to friends and family       Review of Systems     Constitutional:  Negative for fever, chills, weight loss, weight gain, fatigue, decreased appetite, night sweats, recent stressors, height gain, height loss, post-operative complications, incisional pain, hallucinations, increased energy, hyperactivity and confused.   HENT:  Negative for ear pain, hearing loss, tinnitus, nosebleeds, trouble swallowing, hoarse voice, mouth sores, sore throat, ear discharge, tooth pain, gum tenderness, taste disturbance, smell disturbance, hearing aid, bleeding gums, dry mouth, sinus pain, sinus congestion and neck mass.    Eyes:  Negative for double vision, pain,  redness, eye pain, decreased vision, eye watering, eye bulging, eye dryness, flashing lights, spots, floaters, strabismus, tunnel vision, jaundice and eye irritation.   Respiratory:   Negative for cough, hemoptysis, sputum production, shortness of breath, wheezing, sleep disturbances due to breathing, snores loudly, respiratory pain, dyspnea on exertion, cough disturbing sleep and postural dyspnea.    Cardiovascular:  Negative for chest pain, dyspnea on exertion, palpitations, orthopnea, claudication, leg swelling, fingers/toes turn blue, hypertension, hypotension, syncope, history of heart murmur, chest pain on exertion, chest pain at rest, pacemaker, few scattered varicosities, leg pain, sleep disturbances due to breathing, tachycardia, light-headedness, exercise intolerance and edema.   Gastrointestinal:  Negative for heartburn, nausea, vomiting, abdominal pain, diarrhea, constipation, blood in stool, melena, rectal pain, bloating, hemorrhoids, bowel incontinence, jaundice, rectal bleeding, coffee ground emesis and change in stool.   Genitourinary:  Negative for bladder incontinence, dysuria, urgency, hematuria, flank pain, vaginal discharge, difficulty urinating, genital sores, dyspareunia, decreased libido, nocturia, voiding less frequently, arousal difficulty, abnormal vaginal bleeding, excessive menstruation, menstrual changes, hot flashes, vaginal dryness and postmenopausal bleeding.   Musculoskeletal:  Negative for myalgias, back pain, joint swelling, arthralgias, stiffness, muscle cramps, neck pain, bone pain, muscle weakness and fracture.   Skin:  Negative for nail changes, itching, poor wound healing, rash, hair changes, skin changes, acne, warts, poor wound healing, scarring, flaky skin, Raynaud's phenomenon, sensitivity to sunlight and skin thickening.   Neurological:  Negative for dizziness, tingling, tremors, speech change, seizures, loss of consciousness, weakness, light-headedness, numbness,  "headaches, disturbances in coordination, extremity numbness, memory loss, difficulty walking and paralysis.   Endo/Heme:  Negative for anemia, swollen glands and bruises/bleeds easily.   Psychiatric/Behavioral:  Negative for depression, hallucinations, memory loss, decreased concentration, mood swings and panic attacks.    Breast:  Negative for breast discharge, breast mass, breast pain and nipple retraction.   Endocrine:  Negative for altered temperature regulation, polyphagia, polydipsia, unwanted hair growth and change in facial hair.    Unremarkable  PHQ-9 SCORE 12/12/2016 4/24/2018 4/12/2022   PHQ-9 Total Score - - -   PHQ-9 Total Score 0 4 2     REINA-7 SCORE 3/28/2022 3/28/2022 4/12/2022   Total Score - 0 (minimal anxiety) -   Total Score 0 0 2         EXAM:  Blood pressure 119/81, pulse 96, height 1.676 m (5' 6\"), weight 77.7 kg (171 lb 4.8 oz), last menstrual period 04/09/2021, not currently breastfeeding. Body mass index is 27.65 kg/m .  General - pleasant female in no acute distress.  Skin - no suspicious lesions or rashes  EENT-  PERRLA, euthyroid with out palpable nodules  Neck - supple without lymphadenopathy.  Lungs - clear to auscultation bilaterally.  Heart - regular rate and rhythm without murmur.  Abdomen - soft, nontender, nondistended, no masses or organomegaly noted.  Musculoskeletal - no gross deformities.  Neurological - normal strength, sensation, and mental status.    Breast Exam:  Deferred at patient request, just had mammogram prior to clinic appointmen         Pelvic Exam:  EG/BUS: Normal genital architecture without lesions, erythema or abnormal secretions Bartholin's, Urethra, [place]'s normal   Urethral meatus: normal   Urethra: no masses, tenderness, or scarring   Bladder: no masses or tenderness   Vagina: slightly atrophic, thin, with physiologic discharge  Secretions.  Abrasions noted with insertion of speculm  Cervix: Nulliparous,, no lesions and pink, moist, closed, without lesion or " CMT  Uterus: anteverted,  and small, smooth, firm, mobile w/o pain  Adnexa: Within normal limits and No masses, nodularity, tenderness  Rectum:anus normal       ASSESSMENT:  Encounter Diagnoses   Name Primary?     Screening for malignant neoplasm of cervix      Women's annual routine gynecological examination Yes     Acquired hypothyroidism     Answers for HPI/ROS submitted by the patient on 3/29/2022  REINA 7 TOTAL SCORE: 0        PLAN:   Orders Placed This Encounter   Procedures     Obtaining, preparing and conveyance of cervical or vaginal smear to laboratory.     TSH with free T4 reflex     Orders Placed This Encounter   Medications     Vitamin D, Cholecalciferol, 25 MCG (1000 UT) CAPS     levothyroxine (SYNTHROID/LEVOTHROID) 125 MCG tablet     Sig: Take 1 tablet (125 mcg) by mouth daily     Dispense:  30 tablet     Refill:  11         Return to clinic in one year.  Follow-up as needed.    MARGO Navarro CNM

## 2022-04-12 NOTE — LETTER
2022       RE: Paradise Michael  [address]  [place] [address]     Dear Colleague,    Thank you for referring your patient, Paradise Michael, to the [facility] [place] at [facility]. Please see a copy of my visit note below.      Progress Note    SUBJECTIVE:  Paradise Michael is an 56 year old, , who requests an Annual Preventive Exam.       Concerns today include: Hx of abnormal pap smears (HPV positive last in ).  Would like pap repeated today.  Reviewed ASCCP guidelines, next pap due in 3 years.    Would like refill on Synthroid and would like TSH checked.    Menstrual History:  Menstrual History 2018   LAST MENSTRUAL PERIOD 2018   Menarche Age - - -   Period Cycle (Days) - - -   Period Duration (Days) - - -   Method of Contraception - - -   Period Pattern - - -   Menstrual Flow - - -   Menstrual Control - - -   Dysmenorrhea - - -   PMS Symptoms - - -   Reviewed Today - - -       Last    Lab Results   Component Value Date    PAP NIL 2021     History of abnormal Pap smear: HPV positive last in     Last   Lab Results   Component Value Date    HPV16 Negative 2021     Last   Lab Results   Component Value Date    HPV18 Negative 2021     Last   Lab Results   Component Value Date    HRHPV Negative 2021       Mammogram current: yes and not applicable  Last Mammogram:   MA Screening Digital Bilateral    Result Date: 3/9/2021  Narrative: Examination: Bilateral digital screening mammography with computer aided detection. Comparison: 2017, 12/15/2016, 12/10/2015 History/family history: No symptoms, routine screening. BREAST DENSITY: Heterogeneously dense. COMMENTS: There has been no significant change.        Last Colonoscopy: current per patient        HISTORY:  levothyroxine (SYNTHROID/LEVOTHROID) 125 MCG tablet, Take 1 tablet (125 mcg) by mouth  daily  Vitamin D, Cholecalciferol, 25 MCG (1000 UT) CAPS,     No current facility-administered medications on file prior to visit.    Allergies   Allergen Reactions     Gluten      Wheat      Immunization History   Administered Date(s) Administered     COVID-19,PF,Pfizer (12+ Yrs) 2021, 2021     Influenza,INJ,MDCK,PF,Quad >4yrs 10/01/2020     TDAP Vaccine (Boostrix) 2016       OB History    Para Term  AB Living   0 0 0 0 0 0   SAB IAB Ectopic Multiple Live Births   0 0 0 0 0     Past Medical History:   Diagnosis Date     Abnormal Pap smear      Anemia      Bursitis of hip Right hip, twice     Celiac disease      Delayed menarche 15 yrs old     Hypothyroidism      Past Surgical History:   Procedure Laterality Date     COLONOSCOPY       COLPOSCOPY      2019     COLPOSCOPY CERVIX, LOOP ELECTRODE BIOPSY, COMBINED       Family History   Problem Relation Age of Onset     Cerebrovascular Disease Maternal Grandfather      Diabetes Maternal Grandfather      Hypertension Mother      Endocrine Disease Mother         hypothyroidism     Thyroid Disease Mother      Hyperlipidemia Mother      Arthritis Mother      Cancer Mother         Bone marrow cancer     Cerebrovascular Disease Paternal Grandfather      Heart Surgery Father         CABG and stent placement     Arthritis Father      Cerebrovascular Disease Maternal Grandmother      Celiac Disease Sister      Anemia Sister      Social History     Socioeconomic History     Marital status: Single     Spouse name: None     Number of children: 0     Years of education: College     Highest education level: None   Occupational History     Occupation: Works for a [employer]     Employer: [employer]   Tobacco Use     Smoking status: Never Smoker     Smokeless tobacco: Never Used   Substance and Sexual Activity     Alcohol use: Yes     Alcohol/week: 2.0 standard drinks     Comment: Social alcohol intake     Drug use: No     Sexual activity:  Not Currently     Partners: Male     Birth control/protection: Pill   Other Topics Concern      Service No     Blood Transfusions Yes     Comment: Anemia 1997     Caffeine Concern No     Comment: 2-3 cups of coffee a day     Occupational Exposure Yes     Hobby Hazards No     Comment: Enjoys aqua classes, swimming, biking, walking, movies     Sleep Concern No     Comment: Wakes up several times a night, gets 6 hrs of sleep a night     Stress Concern No     Comment: Stress job, exercise to cope     Weight Concern Yes     Comment: Related to thyroid issues     Special Diet Yes     Comment: Vegetarian 4 years, gluten free     Back Care Yes     Comment: No diagnosis, has done PT     Exercise No     Comment: Walks, swimming, yoga, exercises 3-4 times a week for 1 hr each time     Bike Helmet Yes     Seat Belt Yes     Self-Exams Yes     Comment: breast   Social History Narrative    How much exercise per week? 3 days    How much calcium per day? 3 servings       How much caffeine per day? 2 cups coffee    How much vitamin D per day? 2000 iu    Do you/your family wear seatbelts?  Yes    Do you/your family use safety helmets? Yes    Do you/your family use sunscreen? Yes    Do you/your family keep firearms in the home? No    Do you/your family have a smoke detector(s)? Yes        Do you feel safe in your home? Yes    Has anyone ever touched you in an unwanted manner? No     Explain         November 18, 2014 Yaakov Cruz LPN        Relational: Previously , no children        Living situation: Previously was living in [place], moved to [place]        Support network: Close to friends and family       Review of Systems     Constitutional:  Negative for fever, chills, weight loss, weight gain, fatigue, decreased appetite, night sweats, recent stressors, height gain, height loss, post-operative complications, incisional pain, hallucinations, increased energy, hyperactivity and confused.   HENT:  Negative for ear pain,  hearing loss, tinnitus, nosebleeds, trouble swallowing, hoarse voice, mouth sores, sore throat, ear discharge, tooth pain, gum tenderness, taste disturbance, smell disturbance, hearing aid, bleeding gums, dry mouth, sinus pain, sinus congestion and neck mass.    Eyes:  Negative for double vision, pain, redness, eye pain, decreased vision, eye watering, eye bulging, eye dryness, flashing lights, spots, floaters, strabismus, tunnel vision, jaundice and eye irritation.   Respiratory:   Negative for cough, hemoptysis, sputum production, shortness of breath, wheezing, sleep disturbances due to breathing, snores loudly, respiratory pain, dyspnea on exertion, cough disturbing sleep and postural dyspnea.    Cardiovascular:  Negative for chest pain, dyspnea on exertion, palpitations, orthopnea, claudication, leg swelling, fingers/toes turn blue, hypertension, hypotension, syncope, history of heart murmur, chest pain on exertion, chest pain at rest, pacemaker, few scattered varicosities, leg pain, sleep disturbances due to breathing, tachycardia, light-headedness, exercise intolerance and edema.   Gastrointestinal:  Negative for heartburn, nausea, vomiting, abdominal pain, diarrhea, constipation, blood in stool, melena, rectal pain, bloating, hemorrhoids, bowel incontinence, jaundice, rectal bleeding, coffee ground emesis and change in stool.   Genitourinary:  Negative for bladder incontinence, dysuria, urgency, hematuria, flank pain, vaginal discharge, difficulty urinating, genital sores, dyspareunia, decreased libido, nocturia, voiding less frequently, arousal difficulty, abnormal vaginal bleeding, excessive menstruation, menstrual changes, hot flashes, vaginal dryness and postmenopausal bleeding.   Musculoskeletal:  Negative for myalgias, back pain, joint swelling, arthralgias, stiffness, muscle cramps, neck pain, bone pain, muscle weakness and fracture.   Skin:  Negative for nail changes, itching, poor wound healing,  "rash, hair changes, skin changes, acne, warts, poor wound healing, scarring, flaky skin, Raynaud's phenomenon, sensitivity to sunlight and skin thickening.   Neurological:  Negative for dizziness, tingling, tremors, speech change, seizures, loss of consciousness, weakness, light-headedness, numbness, headaches, disturbances in coordination, extremity numbness, memory loss, difficulty walking and paralysis.   Endo/Heme:  Negative for anemia, swollen glands and bruises/bleeds easily.   Psychiatric/Behavioral:  Negative for depression, hallucinations, memory loss, decreased concentration, mood swings and panic attacks.    Breast:  Negative for breast discharge, breast mass, breast pain and nipple retraction.   Endocrine:  Negative for altered temperature regulation, polyphagia, polydipsia, unwanted hair growth and change in facial hair.    Unremarkable  PHQ-9 SCORE 12/12/2016 4/24/2018 4/12/2022   PHQ-9 Total Score - - -   PHQ-9 Total Score 0 4 2     REINA-7 SCORE 3/28/2022 3/28/2022 4/12/2022   Total Score - 0 (minimal anxiety) -   Total Score 0 0 2         EXAM:  Blood pressure 119/81, pulse 96, height 1.676 m (5' 6\"), weight 77.7 kg (171 lb 4.8 oz), last menstrual period 04/09/2021, not currently breastfeeding. Body mass index is 27.65 kg/m .  General - pleasant female in no acute distress.  Skin - no suspicious lesions or rashes  EENT-  PERRLA, euthyroid with out palpable nodules  Neck - supple without lymphadenopathy.  Lungs - clear to auscultation bilaterally.  Heart - regular rate and rhythm without murmur.  Abdomen - soft, nontender, nondistended, no masses or organomegaly noted.  Musculoskeletal - no gross deformities.  Neurological - normal strength, sensation, and mental status.    Breast Exam:  Deferred at patient request, just had mammogram prior to clinic appointmen         Pelvic Exam:  EG/BUS: Normal genital architecture without lesions, erythema or abnormal secretions Bartholin's, Urethra, [place]'s " normal   Urethral meatus: normal   Urethra: no masses, tenderness, or scarring   Bladder: no masses or tenderness   Vagina: slightly atrophic, thin, with physiologic discharge  Secretions.  Abrasions noted with insertion of speculm  Cervix: Nulliparous,, no lesions and pink, moist, closed, without lesion or CMT  Uterus: anteverted,  and small, smooth, firm, mobile w/o pain  Adnexa: Within normal limits and No masses, nodularity, tenderness  Rectum:anus normal       ASSESSMENT:  Encounter Diagnoses   Name Primary?     Screening for malignant neoplasm of cervix      Women's annual routine gynecological examination Yes     Acquired hypothyroidism     Answers for HPI/ROS submitted by the patient on 3/29/2022  REINA 7 TOTAL SCORE: 0        PLAN:   Orders Placed This Encounter   Procedures     Obtaining, preparing and conveyance of cervical or vaginal smear to laboratory.     TSH with free T4 reflex     Orders Placed This Encounter   Medications     Vitamin D, Cholecalciferol, 25 MCG (1000 UT) CAPS     levothyroxine (SYNTHROID/LEVOTHROID) 125 MCG tablet     Sig: Take 1 tablet (125 mcg) by mouth daily     Dispense:  30 tablet     Refill:  11         Return to clinic in one year.  Follow-up as needed.    MARGO NavarroM

## 2022-04-14 LAB
BKR LAB AP GYN ADEQUACY: NORMAL
BKR LAB AP GYN INTERPRETATION: NORMAL
BKR LAB AP HPV REFLEX: NORMAL
BKR LAB AP LMP: NORMAL
BKR LAB AP PREVIOUS ABNL DX: NORMAL
BKR LAB AP PREVIOUS ABNORMAL: NORMAL
PATH REPORT.COMMENTS IMP SPEC: NORMAL
PATH REPORT.COMMENTS IMP SPEC: NORMAL
PATH REPORT.RELEVANT HX SPEC: NORMAL

## 2022-04-16 NOTE — PROGRESS NOTES
OVERVIEW: Paradise Michael is a 56 year old female who presents to [facility] today for Thyroid Problem (Follow up)        ASSESSMENT/PLAN:    1. 57 yo with hypothyroidism and low TSH (overmedicated) with the level of 125 mcg/day   - Decrease amount to 100 mcg/day. Take daily on an empty stomach. 60 tabs given with no refills.   - Recheck labs in 6-8 weeks. Order written.   Will change dosage if needed.   Also, requests referral to Endocrinology.     - Also,discussed standing desk usage for body composition given lower Thyroid medication.     Follow-up - 2 months. Can be in person or virtual    --Yan Mena MD      SUBJECTIVE:     This is my first time meeting Paradise. She is a 56 year old who has Celiac and Hypothyroidism (1st diagnosed about 12 years ago).   Has been taking 125 mcg/day for the past 4 years.  Had recent lab evaluation showing very low TSH.  Has also noticed some weight loss recently but other than that has been in her usual state of health.  She does not feel hyper.  Does not feel that her heart is racing.      Review Of Systems:    Has otherwise been in usual state of health, e.g.   Cardiovascular: negative  Respiratory: No shortness of breath, dyspnea on exertion, cough, or hemoptysis  Gastrointestinal: negative  Genitourinary: negative    Problem list per EMR:  Patient Active Problem List   Diagnosis     Presbyopia - Both Eyes     Screening for cervical cancer     Celiac disease     Hypothyroidism     Bursitis of left hip     Urge incontinence       Current Outpatient Medications   Medication Sig Dispense Refill     levothyroxine (SYNTHROID/LEVOTHROID) 125 MCG tablet Take 1 tablet (125 mcg) by mouth daily 30 tablet 11     levothyroxine (SYNTHROID/LEVOTHROID) 125 MCG tablet Take 1 tablet (125 mcg) by mouth daily 30 tablet 0     Vitamin D (Cholecalciferol) 25 MCG (1000 UT) CAPS          Allergies   Allergen Reactions     Gluten      Wheat         Social:   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      OBJECTIVE    Vitals: /84   Pulse 73   Temp 97  F (36.1  C)   Ht 1.676 m (5' 5.98\")   Wt 78.1 kg (172 lb 1.3 oz)   LMP 04/09/2021   SpO2 99%   BMI 27.79 kg/m    BMI= Body mass index is 27.79 kg/m .  Appears well in no distress.  Normal affect.  Normal conversational tone.    Cardiovascular-regular rate and rhythm without murmur    Lungs-clear to auscultation    Reflexes-are +2 and bilaterally symmetrical with a normal return to baseline    TSH   Date Value Ref Range Status   04/12/2022 0.02 (L) 0.40 - 4.00 mU/L Final   03/08/2021 3.00 0.40 - 4.00 mU/L Final       Wt Readings from Last 5 Encounters:   04/18/22 78.1 kg (172 lb 1.3 oz)   04/12/22 77.7 kg (171 lb 4.8 oz)   03/08/21 85.9 kg (189 lb 4.8 oz)   01/19/21 85 kg (187 lb 8 oz)   10/28/20 82.6 kg (182 lb)       SEE TOP OF NOTE FOR ASSESSMENT AND PLAN    --Yan Mena MD  [facility], Department of Family Medicine and Community Health  "

## 2022-04-18 ENCOUNTER — OFFICE VISIT (OUTPATIENT)
Dept: FAMILY MEDICINE | Facility: CLINIC | Age: 57
End: 2022-04-18
Payer: COMMERCIAL

## 2022-04-18 VITALS
HEART RATE: 73 BPM | HEIGHT: 66 IN | DIASTOLIC BLOOD PRESSURE: 84 MMHG | WEIGHT: 172.08 LBS | OXYGEN SATURATION: 99 % | SYSTOLIC BLOOD PRESSURE: 137 MMHG | BODY MASS INDEX: 27.65 KG/M2 | TEMPERATURE: 97 F

## 2022-04-18 DIAGNOSIS — E03.9 ACQUIRED HYPOTHYROIDISM: ICD-10-CM

## 2022-04-18 LAB
HUMAN PAPILLOMA VIRUS 16 DNA: NEGATIVE
HUMAN PAPILLOMA VIRUS 18 DNA: NEGATIVE
HUMAN PAPILLOMA VIRUS FINAL DIAGNOSIS: NORMAL
HUMAN PAPILLOMA VIRUS OTHER HR: NEGATIVE

## 2022-04-18 RX ORDER — LEVOTHYROXINE SODIUM 100 UG/1
100 TABLET ORAL DAILY
Qty: 60 TABLET | Refills: 0 | Status: SHIPPED | OUTPATIENT
Start: 2022-04-18 | End: 2022-06-06

## 2022-04-18 NOTE — PATIENT INSTRUCTIONS
ASSESSMENT/PLAN:    55 yo with hypothyroidism and low TSH (overmedicated) with the level of 125 mcg/day   - Decrease amount to 100 mcg/day. Take daily on an empty stomach. 60 tabs given with no refills.   - Recheck labs in 6-8 weeks. Order written.   Will change dosage if needed.   - Referral to Endocrinology    - Also,discussed standing desk usage for body composition given lower Thyroid medication.     Follow-up - 2 months. Can be in person or virtual    --Yan Mena MD

## 2022-04-18 NOTE — NURSING NOTE
"56 year old  Chief Complaint   Patient presents with     Thyroid Problem     Follow up       Blood pressure 137/84, pulse 73, temperature 97  F (36.1  C), height 1.676 m (5' 5.98\"), weight 78.1 kg (172 lb 1.3 oz), last menstrual period 04/09/2021, SpO2 99 %, not currently breastfeeding. Body mass index is 27.79 kg/m .  Patient Active Problem List   Diagnosis     Presbyopia - Both Eyes     Screening for cervical cancer     Celiac disease     Hypothyroidism     Bursitis of left hip     Urge incontinence       Wt Readings from Last 2 Encounters:   04/18/22 78.1 kg (172 lb 1.3 oz)   04/12/22 77.7 kg (171 lb 4.8 oz)     BP Readings from Last 3 Encounters:   04/18/22 137/84   04/12/22 119/81   03/08/21 (!) 145/91         Current Outpatient Medications   Medication     levothyroxine (SYNTHROID/LEVOTHROID) 125 MCG tablet     levothyroxine (SYNTHROID/LEVOTHROID) 125 MCG tablet     Vitamin D (Cholecalciferol) 25 MCG (1000 UT) CAPS     No current facility-administered medications for this visit.       Social History     Tobacco Use     Smoking status: Never Smoker     Smokeless tobacco: Never Used   Substance Use Topics     Alcohol use: Yes     Alcohol/week: 2.0 standard drinks     Comment: Social alcohol intake     Drug use: No       Health Maintenance Due   Topic Date Due     ADVANCE CARE PLANNING  Never done     ZOSTER IMMUNIZATION (1 of 2) Never done       Lab Results   Component Value Date    PAP NIL 03/08/2021 April 18, 2022 11:02 AM  "

## 2022-04-28 NOTE — TELEPHONE ENCOUNTER
RECORDS RECEIVED FROM: internal    DATE RECEIVED: 7.25.22    NOTES (FOR ALL VISITS) STATUS DETAILS   OFFICE NOTES from referring provider internal  Dr. Yan Mena   OFFICE NOTES from other specialist internal  4.12.22 [place]   10.28.20 [facility]    ED NOTES     OPERATIVE REPORT  (thyroid, pituitary, adrenal, parathyroid)     MEDICATION LIST internal     IMAGING      DEXASCAN     MRI (BRAIN)     XR (Chest)     CT (HEAD/NECK/CHEST/ABDOMEN)     NUCLEAR      ULTRASOUND (HEAD/NECK)     LABS     DIABETES: HBGA1C, CREATININE, FASTING LIPIDS, MICROALBUMIN URINE, POTASSIUM, TSH, T4    THYROID: TSH, T4, CBC, THYRODLONULIN, TOTAL T3, FREE T4, CALCITONIN, CEA internal  TSH/T4- 4.12.22   Cbc- 3.8.21  HBGA1C- 3.8.22  T4- 10.28.20   Lipid- 10.28.20

## 2022-05-10 DIAGNOSIS — E03.9 ACQUIRED HYPOTHYROIDISM: ICD-10-CM

## 2022-05-11 RX ORDER — LEVOTHYROXINE SODIUM 100 UG/1
TABLET ORAL
Qty: 30 TABLET | Refills: 1 | OUTPATIENT
Start: 2022-05-11

## 2022-05-11 NOTE — TELEPHONE ENCOUNTER
Spoke to pharmacy re: refill of levothyroxine - pt still has 30 tab refill available of the levothyroxine 100 mcg.   Error - pt does not need another refill for now.  Will send Flazio message to karey Salazar RN  [facility]

## 2022-05-31 ENCOUNTER — TELEPHONE (OUTPATIENT)
Dept: FAMILY MEDICINE | Facility: CLINIC | Age: 57
End: 2022-05-31
Payer: COMMERCIAL

## 2022-05-31 NOTE — TELEPHONE ENCOUNTER
Who is calling? Patient  Reason for Call: Patient is coming in for labs Thursday June 2nd for Thyroids. Patient was wondering if Iron and Hemoglobin can be added on. As well as any other labs that Dr. Mena would like completed. Patient would like a call back, when it been updated.

## 2022-06-01 DIAGNOSIS — E03.9 ACQUIRED HYPOTHYROIDISM: Primary | ICD-10-CM

## 2022-06-01 DIAGNOSIS — K90.0 CELIAC DISEASE: ICD-10-CM

## 2022-06-01 NOTE — TELEPHONE ENCOUNTER
Notified pt of lab orders waiting for collection. Pt has scheduled lab visit for Thursday, 6/2.    Aristides ÁLVAREZ, RN  06/01/22 9:16 AM

## 2022-06-02 ENCOUNTER — LAB (OUTPATIENT)
Dept: LAB | Facility: CLINIC | Age: 57
End: 2022-06-02

## 2022-06-02 DIAGNOSIS — E03.9 ACQUIRED HYPOTHYROIDISM: ICD-10-CM

## 2022-06-02 DIAGNOSIS — K90.0 CELIAC DISEASE: ICD-10-CM

## 2022-06-02 LAB
FERRITIN SERPL-MCNC: 30 NG/ML (ref 8–252)
HGB BLD-MCNC: 13.5 G/DL (ref 11.7–15.7)
T4 FREE SERPL-MCNC: 1.21 NG/DL (ref 0.76–1.46)
TSH SERPL DL<=0.005 MIU/L-ACNC: 0.22 MU/L (ref 0.4–4)

## 2022-06-02 PROCEDURE — 82728 ASSAY OF FERRITIN: CPT

## 2022-06-02 PROCEDURE — 84443 ASSAY THYROID STIM HORMONE: CPT

## 2022-06-02 PROCEDURE — 84439 ASSAY OF FREE THYROXINE: CPT

## 2022-06-03 DIAGNOSIS — E03.9 ACQUIRED HYPOTHYROIDISM: ICD-10-CM

## 2022-06-03 NOTE — TELEPHONE ENCOUNTER
Levothyroxine (Synthroid) 100 mcg    Last Office Visit: 4/18/22  Future [facility] Appointments: 6/6/22  Medication last refilled: 4/18/22 #60 with 0 refill(s)    Required labs per protocol:    LAB REF RANGE 4/12/22 6/3/22   TSH 0.40-4.0 mU/L 0.02 Low 0.22 Low     Wait until after visit with Dr. Mena on Monday to approve.          KIMMIE IsidroN, RN, CCM    See video visit from today.   -- Yan Mena MD

## 2022-06-06 ENCOUNTER — VIRTUAL VISIT (OUTPATIENT)
Dept: FAMILY MEDICINE | Facility: CLINIC | Age: 57
End: 2022-06-06
Payer: COMMERCIAL

## 2022-06-06 DIAGNOSIS — M70.62 TROCHANTERIC BURSITIS OF LEFT HIP: ICD-10-CM

## 2022-06-06 DIAGNOSIS — E03.8 OTHER SPECIFIED HYPOTHYROIDISM: Primary | ICD-10-CM

## 2022-06-06 RX ORDER — LEVOTHYROXINE SODIUM 88 UG/1
88 TABLET ORAL DAILY
Qty: 60 TABLET | Refills: 0 | Status: SHIPPED | OUTPATIENT
Start: 2022-06-06 | End: 2022-08-02

## 2022-06-06 NOTE — PROGRESS NOTES
Paradise is a 57 year old who is being evaluated via a billable video visit.      How would you like to obtain your AVS? MyChart  If the video visit is dropped, the invitation should be resent by: Text to cell phone: [phone]  Will anyone else be joining your video visit? No      Video Start Time: 11:26        Subjective   Paradise is a 57 year old who presents for follow up of her thyroid lab. Has been on Synthyroid due to Hypothyroidism.   We lowered the dosage from 125 to 100 mcg/day about 6 weeks ago. She's taking on an empty stomach without problems.   Believes that her hypothyroidism is due to her Celiac's disease.     Feels well other than having a flare of LEFT hip bursitis which is affecting her ability to take long walks. No new injuries.          Objective           Vitals:  No vitals were obtained today due to virtual visit.    Physical Exam   GENERAL: Healthy, alert and no distress  RESP: No audible wheeze, cough, or visible cyanosis.  No visible retractions or increased work of breathing.    SKIN: Visible skin clear. No significant rash, abnormal pigmentation or lesions.  NEURO: Cranial nerves grossly intact.  Mentation and speech appropriate for age.  PSYCH: Mentation appears normal, affect normal/bright, judgement and insight intact, normal speech and appearance well-groomed.      TSH   Date Value Ref Range Status   06/02/2022 0.22 (L) 0.40 - 4.00 mU/L Final   03/08/2021 3.00 0.40 - 4.00 mU/L Final       A/P  1. Hypothyroidism with low TSH.   - Decrease Synthyroid from 100 to 88 mcg/day. #60 with no refills.   Take on empty stomach.   Have recheck in about 6 weeks. Has a visit with Endocrinology at that time and they can check level. Or, alternatively, I'll place a lab order in chart    2. LEFT hip bursitis  Filled out referral for P.T.     Return to clinic if hip pain persists or otherwise as needed.     Video-Visit Details    Type of service:  Video Visit    Video End Time:11:43  AM    Originating Location (pt. Location): Home    Distant Location (provider location):  [facility]     Platform used for Video Visit: Osito

## 2022-07-21 ASSESSMENT — ENCOUNTER SYMPTOMS
HEMATURIA: 0
FLANK PAIN: 0
DIFFICULTY URINATING: 0
DYSURIA: 0

## 2022-07-25 ENCOUNTER — PRE VISIT (OUTPATIENT)
Dept: ENDOCRINOLOGY | Facility: CLINIC | Age: 57
End: 2022-07-25

## 2022-07-25 ENCOUNTER — VIRTUAL VISIT (OUTPATIENT)
Dept: ENDOCRINOLOGY | Facility: CLINIC | Age: 57
End: 2022-07-25
Attending: FAMILY MEDICINE
Payer: COMMERCIAL

## 2022-07-25 DIAGNOSIS — E03.9 HYPOTHYROIDISM, UNSPECIFIED TYPE: Primary | ICD-10-CM

## 2022-07-25 PROCEDURE — 99203 OFFICE O/P NEW LOW 30 MIN: CPT | Mod: 95 | Performed by: INTERNAL MEDICINE

## 2022-07-25 NOTE — PROGRESS NOTES
Diabetes & Endocrinology Clinic New Consult     Reason for visit/consult: Hypothyroidism    Primary care provider:    Yan Mena MD     PCP - General, Family Medicine     HPI:  51 yo female w/ ho celiac sprue, hypothyroidism, seen at consult request of Dr. Mena for e/m +TPO Hypothyroidism who takes LT4 88 mcg every day (prescription was given on 6/6/22 #60 tabs, 0 refills), dose was lowered from 125 to 100 mcg/day about 12 weeks ago. She's taking 88 mcg on an empty stomach without problems. Due for TFTs now. Weight was going up over the years, she lost weight recently, unintentionally about 10 lbs, has been in 170s-180s. She has not had a period in over a year, and is now in menopause uneventfully.  ENDO THYROID LABS-[facility] Latest Ref Rng & Units 6/2/2022 4/12/2022   TSH 0.40 - 4.00 mU/L 0.22 (L) 0.02 (L)   FREE T3 2.3 - 4.2 pg/mL     T3 60 - 181 ng/dL     FREE T4 0.76 - 1.46 ng/dL 1.21 1.34       Past Medical/Surgical History:  Past Medical History:   Diagnosis Date     Abnormal Pap smear      Anemia      Bursitis of hip Right hip, twice     Celiac disease      Delayed menarche 15 yrs old     Hypothyroidism      Past Surgical History:   Procedure Laterality Date     COLONOSCOPY       COLPOSCOPY      2019     COLPOSCOPY CERVIX, LOOP ELECTRODE BIOPSY, COMBINED  1992       Allergies:  Allergies   Allergen Reactions     Gluten      Wheat        Current Medications MEDICATIONS IN THIS CHART MAY NOT BE ACCURATE.    Current Outpatient Medications   Medication     levothyroxine (SYNTHROID/LEVOTHROID) 88 MCG tablet     Vitamin D (Cholecalciferol) 25 MCG (1000 UT) CAPS     No current facility-administered medications for this visit.       Family History:  Family History   Problem Relation Age of Onset     Cerebrovascular Disease Maternal Grandfather      Diabetes Maternal Grandfather      Hypertension Mother      Endocrine Disease Mother         hypothyroidism     Thyroid Disease Mother      Hyperlipidemia  Mother      Arthritis Mother      Cancer Mother         Bone marrow cancer     Cerebrovascular Disease Paternal Grandfather      Heart Surgery Father         CABG and stent placement     Arthritis Father      Cerebrovascular Disease Maternal Grandmother      Celiac Disease Sister      Anemia Sister        Social History:  Social History     Tobacco Use     Smoking status: Never Smoker     Smokeless tobacco: Never Used   Substance Use Topics     Alcohol use: Yes     Alcohol/week: 2.0 standard drinks     Comment: Social alcohol intake     ROS:  see HPI and list below    Exam  No vitals, video visit  GEN: Healthy, alert and no distress  HEENT: EOMI  RESP: No audible wheeze, cough, or visible cyanosis  SKIN: Visible skin clear  NEURO: Cranial nerves grossly intact. Mentation and speech appropriate for age  PSYCH: Mentation appears normal, affect normal/bright, judgement and insight intact, normal speech and appearance well-groomed    Labs/Imaging    See HPI    ENDO THYROID LABS-[facility] Latest Ref Rng & Units 6/2/2022 4/12/2022   TSH 0.40 - 4.00 mU/L 0.22 (L) 0.02 (L)   FREE T3 2.3 - 4.2 pg/mL     T3 60 - 181 ng/dL     FREE T4 0.76 - 1.46 ng/dL 1.21 1.34     ENDO THYROID LABS-[facility] Latest Ref Rng & Units 3/8/2021 12/28/2020   TSH 0.40 - 4.00 mU/L 3.00 6.28 (H)   FREE T3 2.3 - 4.2 pg/mL     T3 60 - 181 ng/dL     FREE T4 0.76 - 1.46 ng/dL  1.12     ENDO THYROID LABS-[facility] Latest Ref Rng & Units 10/28/2020 10/19/2017   TSH 0.40 - 4.00 mU/L 4.38 (H) 0.66   FREE T3 2.3 - 4.2 pg/mL     T3 60 - 181 ng/dL 96    FREE T4 0.76 - 1.46 ng/dL 1.22      ENDO THYROID LABS-[facility] Latest Ref Rng & Units 8/11/2017 8/19/2016   TSH 0.40 - 4.00 mU/L 0.32 (L) 1.55   FREE T3 2.3 - 4.2 pg/mL 2.9    T3 60 - 181 ng/dL  83   FREE T4 0.76 - 1.46 ng/dL 1.37 1.22     ENDO THYROID LABS-[facility] Latest Ref Rng & Units 11/18/2014 11/26/2013   TSH 0.40 - 4.00 mU/L 2.78 3.14   FREE T3 2.3 - 4.2 pg/mL     T3 60 - 181 ng/dL     FREE T4 0.76 - 1.46 ng/dL       ENDO THYROID  LABS-[facility] Latest Ref Rng & Units 1/10/2013 10/30/2012   TSH 0.40 - 4.00 mU/L 0.72 5.91 (H)   FREE T3 2.3 - 4.2 pg/mL     T3 60 - 181 ng/dL     FREE T4 0.76 - 1.46 ng/dL 1.24 1.27     Assessment and Plan    Hypothyroidism, ho +TPO antibody referred to in Dr. Justice's note from many years ago, but this data is not available in Epic and it not visible in Care Everywhere. She has been on LT4 for many years with doses ranging from  mcg, most recently, is on LT4 88 mcg every day and she should be at steady state now for labs since last dose change was in early June 2022, when she went from 100 to 88 mcg qd. Will add on antibodies to try to help explain why patient's dose has required more frequent changes.    Patient Instructions:    Nice to talk with you today in virtual clinic!    For now until you have thyroid labs, please continue taking levothyroxine 88 mcg every day.    Please go to any [facility] lab.  Follow standard safety precautions for COVID-19. Practice social isolation, hand hygiene, do not touch your face, nose, or mouth, wear mask if have to go out in public to be respectful of community.  Please contact us to schedule at any of our [facility] lab locations. Call [phone] ([phone]), select option 1.    RTC: annual as needed based on results    Please use [employer] to schedule your appointment(s) or call [phone] to schedule in Endocrinology and Diabetes Clinic      Best Wishes,  Dr. Cinda Hammond MD, MPH  Endocrinologist      Video: approximately 17 minutes    Total Time: 40 min spent on chart review, evaluation, management, counseling, education, & motivational interviewing on the day of encounter      Answers for HPI/ROS submitted by the patient on 7/21/2022  General Symptoms: No  Skin Symptoms: No  HENT Symptoms: No  EYE SYMPTOMS: No  HEART SYMPTOMS: No  LUNG SYMPTOMS: No  INTESTINAL SYMPTOMS: No  URINARY SYMPTOMS: Yes  GYNECOLOGIC SYMPTOMS: No  BREAST SYMPTOMS:  No  SKELETAL SYMPTOMS: No  BLOOD SYMPTOMS: No  NERVOUS SYSTEM SYMPTOMS: No  MENTAL HEALTH SYMPTOMS: No  Trouble holding urine or incontinence: No  Pain or burning: No  Trouble starting or stopping: No  Increased frequency of urination: No  Blood in urine: No  Decreased frequency of urination: No  Frequent nighttime urination: Yes  Flank pain: No  Difficulty emptying bladder: No

## 2022-07-25 NOTE — PATIENT INSTRUCTIONS
Nice to talk with you today in virtual clinic!    For now until you have thyroid labs, please continue taking levothyroxine 88 mcg every day.    Please go to any [facility] lab.  Follow standard safety precautions for COVID-19. Practice social isolation, hand hygiene, do not touch your face, nose, or mouth, wear mask if have to go out in public to be respectful of community.  Please contact us to schedule at any of our [facility] lab locations. Call [phone]), select option 1.    RTC: annual as needed based on results    Please use [employer] to schedule your appointment(s) or call [phone] to schedule in Endocrinology and Diabetes Clinic      Best Wishes,  Dr. Cinda Hammond MD, MPH  Endocrinologist

## 2022-07-25 NOTE — PROGRESS NOTES
Paradise Michael  is being evaluated via a billable video visit.      How would you like to obtain your AVS? [employer]  For the video visit, send the invitation by: Text to cell phone: [phone]   Will anyone else be joining your video visit? No

## 2022-07-25 NOTE — LETTER
7/25/2022       RE: Paradise Michael  [address]  [place] [address]     Dear Colleague,    Thank you for referring your patient, Paradise Michael, to the [facility]OLOG[facility] [place] [facility]. Please see a copy of my visit note below.         Diabetes & Endocrinology Clinic New Consult     Reason for visit/consult: Hypothyroidism    Primary care provider:    Yan Mena MD     PCP - General, Family Medicine     HPI:  51 yo female w/ ho celiac sprue, hypothyroidism, seen at consult request of Dr. Mena for e/m +TPO Hypothyroidism who takes LT4 88 mcg every day (prescription was given on 6/6/22 #60 tabs, 0 refills), dose was lowered from 125 to 100 mcg/day about 12 weeks ago. She's taking 88 mcg on an empty stomach without problems. Due for TFTs now. Weight was going up over the years, she lost weight recently, unintentionally about 10 lbs, has been in 170s-180s. She has not had a period in over a year, and is now in menopause uneventfully.  ENDO THYROID LABS-[facility] Latest Ref Rng & Units 6/2/2022 4/12/2022   TSH 0.40 - 4.00 mU/L 0.22 (L) 0.02 (L)   FREE T3 2.3 - 4.2 pg/mL     T3 60 - 181 ng/dL     FREE T4 0.76 - 1.46 ng/dL 1.21 1.34       Past Medical/Surgical History:  Past Medical History:   Diagnosis Date     Abnormal Pap smear      Anemia      Bursitis of hip Right hip, twice     Celiac disease      Delayed menarche 15 yrs old     Hypothyroidism      Past Surgical History:   Procedure Laterality Date     COLONOSCOPY       COLPOSCOPY      2019     COLPOSCOPY CERVIX, LOOP ELECTRODE BIOPSY, COMBINED  1992       Allergies:  Allergies   Allergen Reactions     Gluten      Wheat        Current Medications MEDICATIONS IN THIS CHART MAY NOT BE ACCURATE.    Current Outpatient Medications   Medication     levothyroxine (SYNTHROID/LEVOTHROID) 88 MCG tablet     Vitamin D (Cholecalciferol) 25 MCG (1000 UT) CAPS     No current  facility-administered medications for this visit.       Family History:  Family History   Problem Relation Age of Onset     Cerebrovascular Disease Maternal Grandfather      Diabetes Maternal Grandfather      Hypertension Mother      Endocrine Disease Mother         hypothyroidism     Thyroid Disease Mother      Hyperlipidemia Mother      Arthritis Mother      Cancer Mother         Bone marrow cancer     Cerebrovascular Disease Paternal Grandfather      Heart Surgery Father         CABG and stent placement     Arthritis Father      Cerebrovascular Disease Maternal Grandmother      Celiac Disease Sister      Anemia Sister        Social History:  Social History     Tobacco Use     Smoking status: Never Smoker     Smokeless tobacco: Never Used   Substance Use Topics     Alcohol use: Yes     Alcohol/week: 2.0 standard drinks     Comment: Social alcohol intake     ROS:  see HPI and list below    Exam  No vitals, video visit  GEN: Healthy, alert and no distress  HEENT: EOMI  RESP: No audible wheeze, cough, or visible cyanosis  SKIN: Visible skin clear  NEURO: Cranial nerves grossly intact. Mentation and speech appropriate for age  PSYCH: Mentation appears normal, affect normal/bright, judgement and insight intact, normal speech and appearance well-groomed    Labs/Imaging    See HPI    ENDO THYROID LABS-[facility] Latest Ref Rng & Units 6/2/2022 4/12/2022   TSH 0.40 - 4.00 mU/L 0.22 (L) 0.02 (L)   FREE T3 2.3 - 4.2 pg/mL     T3 60 - 181 ng/dL     FREE T4 0.76 - 1.46 ng/dL 1.21 1.34     ENDO THYROID LABS-[facility] Latest Ref Rng & Units 3/8/2021 12/28/2020   TSH 0.40 - 4.00 mU/L 3.00 6.28 (H)   FREE T3 2.3 - 4.2 pg/mL     T3 60 - 181 ng/dL     FREE T4 0.76 - 1.46 ng/dL  1.12     ENDO THYROID LABS-[facility] Latest Ref Rng & Units 10/28/2020 10/19/2017   TSH 0.40 - 4.00 mU/L 4.38 (H) 0.66   FREE T3 2.3 - 4.2 pg/mL     T3 60 - 181 ng/dL 96    FREE T4 0.76 - 1.46 ng/dL 1.22      ENDO THYROID LABS-[facility] Latest Ref Rng & Units 8/11/2017 8/19/2016    TSH 0.40 - 4.00 mU/L 0.32 (L) 1.55   FREE T3 2.3 - 4.2 pg/mL 2.9    T3 60 - 181 ng/dL  83   FREE T4 0.76 - 1.46 ng/dL 1.37 1.22     ENDO THYROID LABS-[facility] Latest Ref Rng & Units 11/18/2014 11/26/2013   TSH 0.40 - 4.00 mU/L 2.78 3.14   FREE T3 2.3 - 4.2 pg/mL     T3 60 - 181 ng/dL     FREE T4 0.76 - 1.46 ng/dL       ENDO THYROID LABS-[facility] Latest Ref Rng & Units 1/10/2013 10/30/2012   TSH 0.40 - 4.00 mU/L 0.72 5.91 (H)   FREE T3 2.3 - 4.2 pg/mL     T3 60 - 181 ng/dL     FREE T4 0.76 - 1.46 ng/dL 1.24 1.27     Assessment and Plan    Hypothyroidism, ho +TPO antibody referred to in Dr. Justice's note from many years ago, but this data is not available in Epic and it not visible in Care Everywhere. She has been on LT4 for many years with doses ranging from  mcg, most recently, is on LT4 88 mcg every day and she should be at steady state now for labs since last dose change was in early June 2022, when she went from 100 to 88 mcg qd. Will add on antibodies to try to help explain why patient's dose has required more frequent changes.    Patient Instructions:    Nice to talk with you today in virtual clinic!    For now until you have thyroid labs, please continue taking levothyroxine 88 mcg every day.    Please go to any [facility] lab.  Follow standard safety precautions for COVID-19. Practice social isolation, hand hygiene, do not touch your face, nose, or mouth, wear mask if have to go out in public to be respectful of community.  Please contact us to schedule at any of our [facility] lab locations. Call [phone]), select option 1.    RTC: annual as needed based on results    Please use [employer] to schedule your appointment(s) or call [phone] to schedule in Endocrinology and Diabetes Clinic      Best Wishes,  Dr. Cinda Hammond MD, MPH  Endocrinologist      Video: approximately 17 minutes    Total Time: 40 min spent on chart review, evaluation, management, counseling,  education, & motivational interviewing on the day of encounter      Answers for HPI/ROS submitted by the patient on 7/21/2022  General Symptoms: No  Skin Symptoms: No  HENT Symptoms: No  EYE SYMPTOMS: No  HEART SYMPTOMS: No  LUNG SYMPTOMS: No  INTESTINAL SYMPTOMS: No  URINARY SYMPTOMS: Yes  GYNECOLOGIC SYMPTOMS: No  BREAST SYMPTOMS: No  SKELETAL SYMPTOMS: No  BLOOD SYMPTOMS: No  NERVOUS SYSTEM SYMPTOMS: No  MENTAL HEALTH SYMPTOMS: No  Trouble holding urine or incontinence: No  Pain or burning: No  Trouble starting or stopping: No  Increased frequency of urination: No  Blood in urine: No  Decreased frequency of urination: No  Frequent nighttime urination: Yes  Flank pain: No  Difficulty emptying bladder: No        Paradise Fernanda  is being evaluated via a billable video visit.      How would you like to obtain your AVS? [email or web address]  For the video visit, send the invitation by: Text to cell phone: [phone]   Will anyone else be joining your video visit? No

## 2022-07-26 ENCOUNTER — OFFICE VISIT (OUTPATIENT)
Dept: URGENT CARE | Facility: URGENT CARE | Age: 57
End: 2022-07-26
Payer: COMMERCIAL

## 2022-07-26 ENCOUNTER — TELEPHONE (OUTPATIENT)
Dept: FAMILY MEDICINE | Facility: CLINIC | Age: 57
End: 2022-07-26

## 2022-07-26 ENCOUNTER — LAB (OUTPATIENT)
Dept: LAB | Facility: CLINIC | Age: 57
End: 2022-07-26
Payer: COMMERCIAL

## 2022-07-26 VITALS
SYSTOLIC BLOOD PRESSURE: 139 MMHG | RESPIRATION RATE: 16 BRPM | OXYGEN SATURATION: 100 % | HEART RATE: 63 BPM | DIASTOLIC BLOOD PRESSURE: 87 MMHG | TEMPERATURE: 97.7 F

## 2022-07-26 DIAGNOSIS — S61.411A LACERATION OF RIGHT HAND, FOREIGN BODY PRESENCE UNSPECIFIED, INITIAL ENCOUNTER: Primary | ICD-10-CM

## 2022-07-26 DIAGNOSIS — E03.9 HYPOTHYROIDISM, UNSPECIFIED TYPE: ICD-10-CM

## 2022-07-26 LAB
T4 FREE SERPL-MCNC: 1.49 NG/DL (ref 0.9–1.7)
TSH SERPL DL<=0.005 MIU/L-ACNC: 1.08 UIU/ML (ref 0.3–4.2)

## 2022-07-26 PROCEDURE — 84443 ASSAY THYROID STIM HORMONE: CPT

## 2022-07-26 PROCEDURE — 86376 MICROSOMAL ANTIBODY EACH: CPT

## 2022-07-26 PROCEDURE — 84445 ASSAY OF TSI GLOBULIN: CPT

## 2022-07-26 PROCEDURE — 12002 RPR S/N/AX/GEN/TRNK2.6-7.5CM: CPT | Performed by: FAMILY MEDICINE

## 2022-07-26 PROCEDURE — 86800 THYROGLOBULIN ANTIBODY: CPT

## 2022-07-26 PROCEDURE — 84439 ASSAY OF FREE THYROXINE: CPT

## 2022-07-26 NOTE — PROGRESS NOTES
SUBJECTIVE:     Chief Complaint   Patient presents with     Urgent Care     Laceration     Per pt states she cut her right hand while opening a can of beans happened at 1PM today     Paradise Michael is a 57 year old female who presents to the clinic with a laceration on the right hand sustained 4 hour(s) ago.  This is a non-work related injury.    Mechanism of injury: opening a can was treated with steri strips, when  seen in [facility]  when started bleeding was  advised to come to urgent care .    Associated symptoms: Denies numbness, weakness, or loss of function  Last tetanus booster within 10 years: yes    EXAM:   The patient appears today in alert,no apparent distress distress  VITALS: /87   Pulse 63   Temp 97.7  F (36.5  C) (Skin)   Resp 16   LMP 04/09/2021   SpO2 100%     Size of laceration: 2.7 centimeters located in the palmar aspect and dorsum of the right hand in between the interdigital space between the 1st and second   Characteristics of the laceration: active bleeding and clean  Tendon function intact: yes  Sensation to light touch intact: yes  Pulses intact: not applicable  Picture included in patient's chart: no    Assessment:  Paradise was seen today for urgent care and laceration.    Diagnoses and all orders for this visit:    Laceration of right hand, foreign body presence unspecified, initial encounter  -     REPAIR SUPERFICIAL, WOUND BODY 2.6-7.5 CM          PLAN:  PROCEDURE NOTE::  Wound was locally injected with 1 cc's of Lidocaine 1% plain  Wound cleaned with saline  Wound cleaned with Shur-Clens  Wound soaked  Laceration was closed using 4 0 sutures interrupted sutures  After care instructions:  Keep wound clean and dry for the next 24-48 hours  Sutures out in 10 days  Signs of infection discussed today  Active range of motion exercises encouraged    Mary Grace Blanco MD

## 2022-07-26 NOTE — TELEPHONE ENCOUNTER
Who is calling? Patient  Reason for Call: Patient cut her finger while opening a can and the bleeding stops when pressure is applied. Patient would like to speak with RN about what to do.

## 2022-07-26 NOTE — TELEPHONE ENCOUNTER
Called pt in regard to her finger and she stated she was almost to clinic for a lab draw. Informed her that an RN will assess when she arrives. TDAP 0n 12/12/2016.  Cleaned hand and thumb area with soap and water. Applied bacitracin and 3 steri strips. Wound was not bleeding.  Patient then washed her hand after her lab appointment and it started bleeding again.  Due to the location of the laceration, in between hand and thumb, it was recommended that she go to urgent care for glue or sutures.    HENRI Puckett, RN  07/26/22, 3:03 PM

## 2022-07-27 ENCOUNTER — TELEPHONE (OUTPATIENT)
Dept: FAMILY MEDICINE | Facility: CLINIC | Age: 57
End: 2022-07-27

## 2022-07-27 LAB
THYROGLOB AB SERPL IA-ACNC: <20 IU/ML
THYROPEROXIDASE AB SERPL-ACNC: 126 IU/ML

## 2022-07-27 NOTE — TELEPHONE ENCOUNTER
Called Paradise to check on her incision injury and she reports she had 4 sutures in her right hand.  Incision was cleaned in urgent care prior to suturing and has a bandage on it, which she was instructed to change daily.   Paradise hasn't changed it yet as she was instructed to wait a full 24 hours before changing the bandage, which won't be until tonight.  Patient scheduled for stitch removal on August 5th at 1:45pm.  HENRI Isidro, RN, [facility]  07/27/22  11:41 AM

## 2022-07-29 NOTE — RESULT ENCOUNTER NOTE
These results can be reviewed by Dr. Hammond when she returns.  They do not need to be acted upon today    Ingris Hoffmann MD

## 2022-07-30 DIAGNOSIS — E03.9 ACQUIRED HYPOTHYROIDISM: ICD-10-CM

## 2022-08-01 ENCOUNTER — TELEPHONE (OUTPATIENT)
Dept: ENDOCRINOLOGY | Facility: CLINIC | Age: 57
End: 2022-08-01

## 2022-08-01 NOTE — TELEPHONE ENCOUNTER
[facility] Call Center    Phone Message    May a detailed message be left on voicemail: yes     Reason for Call: Other: Calling with questions about abnormal findings on her lab results in my chart.  Please follow up . Thank you .      Action Taken: Other: ENDO    Travel Screening: Not Applicable

## 2022-08-02 RX ORDER — LEVOTHYROXINE SODIUM 88 UG/1
88 TABLET ORAL DAILY
Qty: 60 TABLET | Refills: 0 | Status: SHIPPED | OUTPATIENT
Start: 2022-08-02 | End: 2022-11-01

## 2022-08-02 RX ORDER — LEVOTHYROXINE SODIUM 88 UG/1
TABLET ORAL
Qty: 60 TABLET | Refills: 1 | Status: SHIPPED | OUTPATIENT
Start: 2022-08-02 | End: 2022-08-02

## 2022-08-02 NOTE — TELEPHONE ENCOUNTER
Medication requested: levothyroxine (SYNTHROID/LEVOTHROID) 88 MCG tablet  Last office visit: 4/18/2022  [facility] appointments: 8/5/2022  Medication last refilled: 6/6/2022; 60 + 0 refills   Last qualifying labs:     Component      Latest Ref Rng & Units 7/26/2022   TSH      0.30 - 4.20 uIU/mL 1.08     Component      Latest Ref Rng & Units 7/26/2022   T4 Free      0.90 - 1.70 ng/dL 1.49     Medication is being filled for 1 time refill only due to:  Pt is being seen by Endocrinology with pending lab results and advice from the provider who is out of town. One refill supplied in the meantime.     HENRI Puckett, RN  08/02/22, 2:20 PM

## 2022-08-03 LAB — TSI SER-ACNC: <1 TSI INDEX

## 2022-08-05 ENCOUNTER — ALLIED HEALTH/NURSE VISIT (OUTPATIENT)
Dept: FAMILY MEDICINE | Facility: CLINIC | Age: 57
End: 2022-08-05
Payer: COMMERCIAL

## 2022-08-05 DIAGNOSIS — S61.012D THUMB LACERATION, LEFT, SUBSEQUENT ENCOUNTER: Primary | ICD-10-CM

## 2022-08-05 NOTE — PROGRESS NOTES
Paradise is here today for suture removal of the base of her left thumb.  Injury occurred on 7/26/22.  Patient was seen at [facility]/ED on 7/26/22 and had 4 sutures   placed.  Patient is here today to have sutures removed.      Incision is clean, edges approximated and   scabbed, no drainage or pus.  No pain, fever or signs of infection.  Suture removal procedure explained and 4 sutures were   removed without incident.  Patient tolerated theprocedure well.      Reviewed home care instructions.  May shower, but no tub soaking of the incision for another twoweeks.  The scab will sluff leaving pink healing tissue exposed.  If there is concern for additionalabrasion or harm to healing incision/skin, may   cover with a band aid.  Otherwise, try to leave open to air for healing.  If skin is tight and sore,may apply very thin layer of Neosporin or Bacitracinonce daily.  Contact the clinic with further questions or concerns.    KIMMIE IsidroN, RN, [facility]  08/05/22  2:19 PM

## 2022-09-03 ENCOUNTER — HEALTH MAINTENANCE LETTER (OUTPATIENT)
Age: 57
End: 2022-09-03

## 2022-10-29 DIAGNOSIS — E03.9 ACQUIRED HYPOTHYROIDISM: ICD-10-CM

## 2022-11-01 RX ORDER — LEVOTHYROXINE SODIUM 88 UG/1
TABLET ORAL
Qty: 90 TABLET | Refills: 1 | Status: SHIPPED | OUTPATIENT
Start: 2022-11-01 | End: 2023-05-10

## 2022-11-01 NOTE — TELEPHONE ENCOUNTER
Medication requested: levothyroxine (SYNTHROID/LEVOTHROID) 88 MCG tablet  Last office visit: 4/18/22  [facility] appointments: none  Medication last refilled: 8/2/22; 60 + 0 refills  Last qualifying labs:   Component      Latest Ref Rng & Units 7/26/2022   TSH      0.30 - 4.20 uIU/mL 1.08     Component      Latest Ref Rng & Units 7/26/2022   T4 Free      0.90 - 1.70 ng/dL 1.49     Prescription approved per [facility] Refill Protocol.    Aristides ÁLVAREZ, RN  11/01/22 12:10 PM

## 2023-02-17 ENCOUNTER — TELEPHONE (OUTPATIENT)
Dept: FAMILY MEDICINE | Facility: CLINIC | Age: 58
End: 2023-02-17

## 2023-02-17 NOTE — TELEPHONE ENCOUNTER
Paradise called because she recently returned from traveling on Sunday, 2/19/23, and not has cold symptoms.  She has taken two at home COVID antigen tests which were negative.  She purchased Mucinex-Max and on the box it cautions taking it if you have thyroid disease, which Paradise has so she called to inquire what the risks are with taking the Mucinex.  Explained that the Mucinex along with her Levothyroxine can elevate her blood pressure.  She does not have hypertension, but does have a home BP machine.  Discussed signs and symptoms of high blood pressure which she will watch for and cautioned if her BP readings are at or above 140/90, she is to stop taking the Mucinex, which Paradise agreed to do.  Paradise will call with further questions or concerns.  HENRI Isidro, RN, [facility]  02/17/23  12:38 PM

## 2023-04-17 ASSESSMENT — ANXIETY QUESTIONNAIRES
GAD7 TOTAL SCORE: 0
4. TROUBLE RELAXING: NOT AT ALL
GAD7 TOTAL SCORE: 0
7. FEELING AFRAID AS IF SOMETHING AWFUL MIGHT HAPPEN: NOT AT ALL
5. BEING SO RESTLESS THAT IT IS HARD TO SIT STILL: NOT AT ALL
1. FEELING NERVOUS, ANXIOUS, OR ON EDGE: NOT AT ALL
3. WORRYING TOO MUCH ABOUT DIFFERENT THINGS: NOT AT ALL
GAD7 TOTAL SCORE: 0
2. NOT BEING ABLE TO STOP OR CONTROL WORRYING: NOT AT ALL
6. BECOMING EASILY ANNOYED OR IRRITABLE: NOT AT ALL
7. FEELING AFRAID AS IF SOMETHING AWFUL MIGHT HAPPEN: NOT AT ALL

## 2023-04-18 ENCOUNTER — ANCILLARY PROCEDURE (OUTPATIENT)
Dept: MAMMOGRAPHY | Facility: CLINIC | Age: 58
End: 2023-04-18
Attending: ADVANCED PRACTICE MIDWIFE
Payer: COMMERCIAL

## 2023-04-18 ENCOUNTER — OFFICE VISIT (OUTPATIENT)
Dept: OBGYN | Facility: CLINIC | Age: 58
End: 2023-04-18
Attending: ADVANCED PRACTICE MIDWIFE
Payer: COMMERCIAL

## 2023-04-18 VITALS
SYSTOLIC BLOOD PRESSURE: 125 MMHG | BODY MASS INDEX: 27.72 KG/M2 | HEIGHT: 66 IN | DIASTOLIC BLOOD PRESSURE: 83 MMHG | WEIGHT: 172.5 LBS | HEART RATE: 89 BPM

## 2023-04-18 DIAGNOSIS — Z12.31 VISIT FOR SCREENING MAMMOGRAM: ICD-10-CM

## 2023-04-18 DIAGNOSIS — Z00.00 ANNUAL PHYSICAL EXAM: Primary | ICD-10-CM

## 2023-04-18 PROCEDURE — 99396 PREV VISIT EST AGE 40-64: CPT | Performed by: ADVANCED PRACTICE MIDWIFE

## 2023-04-18 PROCEDURE — 77067 SCR MAMMO BI INCL CAD: CPT

## 2023-04-18 PROCEDURE — 77063 BREAST TOMOSYNTHESIS BI: CPT | Mod: 26 | Performed by: STUDENT IN AN ORGANIZED HEALTH CARE EDUCATION/TRAINING PROGRAM

## 2023-04-18 PROCEDURE — 77067 SCR MAMMO BI INCL CAD: CPT | Mod: 26 | Performed by: STUDENT IN AN ORGANIZED HEALTH CARE EDUCATION/TRAINING PROGRAM

## 2023-04-18 PROCEDURE — 99211 OFF/OP EST MAY X REQ PHY/QHP: CPT | Performed by: ADVANCED PRACTICE MIDWIFE

## 2023-04-18 ASSESSMENT — ENCOUNTER SYMPTOMS
STIFFNESS: 0
NERVOUS/ANXIOUS: 0
BREAST PAIN: 0
FEVER: 0
DECREASED APPETITE: 0
HEMATURIA: 0
COUGH: 0
BLOOD IN STOOL: 0
POSTURAL DYSPNEA: 0
JOINT SWELLING: 0
DOUBLE VISION: 0
HALLUCINATIONS: 0
EYE IRRITATION: 0
SNORES LOUDLY: 0
HEARTBURN: 0
LEG SWELLING: 0
HYPERTENSION: 0
DECREASED LIBIDO: 0
TINGLING: 0
RESPIRATORY PAIN: 0
ORTHOPNEA: 0
SINUS PAIN: 0
LOSS OF CONSCIOUSNESS: 0
POLYDIPSIA: 0
DIZZINESS: 0
WEAKNESS: 0
DIFFICULTY URINATING: 0
POLYPHAGIA: 0
EXERCISE INTOLERANCE: 0
NECK MASS: 0
SHORTNESS OF BREATH: 0
CONSTIPATION: 0
DYSPNEA ON EXERTION: 0
POOR WOUND HEALING: 0
MEMORY LOSS: 0
RECTAL PAIN: 0
PARALYSIS: 0
DECREASED CONCENTRATION: 0
NAIL CHANGES: 0
DYSURIA: 0
COUGH DISTURBING SLEEP: 0
NAUSEA: 0
HEADACHES: 0
NECK PAIN: 0
EYE WATERING: 0
DISTURBANCES IN COORDINATION: 0
PANIC: 0
CHILLS: 0
EYE PAIN: 0
ARTHRALGIAS: 0
EXTREMITY NUMBNESS: 0
LIGHT-HEADEDNESS: 0
MUSCLE CRAMPS: 0
RECTAL BLEEDING: 0
WEIGHT GAIN: 0
HEMOPTYSIS: 0
ALTERED TEMPERATURE REGULATION: 0
INCREASED ENERGY: 0
TROUBLE SWALLOWING: 0
HYPOTENSION: 0
SKIN CHANGES: 0
HOT FLASHES: 0
TASTE DISTURBANCE: 0
ABDOMINAL PAIN: 0
SINUS CONGESTION: 0
TACHYCARDIA: 0
WEIGHT LOSS: 0
NIGHT SWEATS: 0
VOMITING: 0
MYALGIAS: 0
NUMBNESS: 0
SPUTUM PRODUCTION: 0
EYE REDNESS: 0
BRUISES/BLEEDS EASILY: 0
SORE THROAT: 0
CLAUDICATION: 0
WHEEZING: 0
PALPITATIONS: 0
TREMORS: 0
MUSCLE WEAKNESS: 0
LEG PAIN: 0
SYNCOPE: 0
FLANK PAIN: 0
DIARRHEA: 0
BLOATING: 0
SWOLLEN GLANDS: 0
SEIZURES: 0
JAUNDICE: 0
HOARSE VOICE: 0
BREAST MASS: 0
SMELL DISTURBANCE: 0
INSOMNIA: 0
SPEECH CHANGE: 0
FATIGUE: 0
SLEEP DISTURBANCES DUE TO BREATHING: 0
BOWEL INCONTINENCE: 0
DEPRESSION: 0
BACK PAIN: 0

## 2023-04-18 NOTE — PROGRESS NOTES
Progress Note    SUBJECTIVE:  Paradise Michael is an 57 year old, , who requests an Annual Preventive Exam.     Concerns today include: Denies    Diet: pretty good. Pescatarian . Supplementing with legumes and fish.   Exercise: Try to do something everyday.  30 min to 1 hour walking, strength training, yoga/pilates  Sleep: 6-8 hours of sleep regularly. Occasional sleep disturbance nothing routine  Stressor: Works for [employer] ([employer] remotely. Lives alone with her cat.   Denies HX of anxiety and depression.     Menopause: has been amenorrheic for 2 years. Has not noticed any VMS.      Menstrual History:      2018    10:40 AM 2021     1:40 PM 2022     2:40 PM   Menstrual History   LAST MENSTRUAL PERIOD 2018       Last    Lab Results   Component Value Date    PAP NIL 22     History of abnormal Pap smear: yes: 2 normals,  age 30- 65 PAP every 3 years recommended    Last   Lab Results   Component Value Date    HPV16 Negative 2022    HPV16 Negative 2021     Last   Lab Results   Component Value Date    HPV18 Negative 2022    HPV18 Negative 2021     Last   Lab Results   Component Value Date    HRHPV Negative 2022    HRHPV Negative 2021       Mammogram current: yes  Last Mammogram:   MA Screening Digital Bilateral    Result Date: 3/9/2021  Narrative: Examination: Bilateral digital screening mammography with computer aided detection. Comparison: 2017, 12/15/2016, 12/10/2015 History/family history: No symptoms, routine screening. BREAST DENSITY: Heterogeneously dense. COMMENTS: There has been no significant change.        Last Colonoscopy: Has had a colonoscopy due to celiac disease; due within the next two years.  Works with GI doctor through [facility] (not [facility])     HISTORY:  levothyroxine (SYNTHROID/LEVOTHROID) 88 MCG tablet, TAKE 1 TABLET BY MOUTH EVERY DAY    No current facility-administered medications on file  prior to visit.    Allergies   Allergen Reactions     Gluten      Wheat      Immunization History   Administered Date(s) Administered     COVID-19 Vaccine 12+ (Pfizer ) 2022     COVID-19 Vaccine 12+ (Pfizer) 2021, 2021, 2021     COVID-19 Vaccine Bivalent Booster 12+ (Pfizer) 10/27/2022     Influenza (IIV3) PF 10/16/2021     Influenza,INJ,MDCK,PF,Quad >6mo(Flucelvax) 10/01/2020, 10/27/2022     TDAP Vaccine (Boostrix) 2016       OB History    Para Term  AB Living   0 0 0 0 0 0   SAB IAB Ectopic Multiple Live Births   0 0 0 0 0     Past Medical History:   Diagnosis Date     Abnormal Pap smear      Anemia      Bursitis of hip Right hip, twice     Celiac disease      Delayed menarche 15 yrs old     Hypothyroidism      Past Surgical History:   Procedure Laterality Date     COLONOSCOPY       COLPOSCOPY      2019     COLPOSCOPY CERVIX, LOOP ELECTRODE BIOPSY, COMBINED       Family History   Problem Relation Age of Onset     Cerebrovascular Disease Maternal Grandfather      Diabetes Maternal Grandfather      Hypertension Mother      Endocrine Disease Mother         hypothyroidism     Thyroid Disease Mother      Hyperlipidemia Mother      Arthritis Mother      Cancer Mother         Bone marrow cancer     Cerebrovascular Disease Paternal Grandfather      Heart Surgery Father         CABG and stent placement     Arthritis Father      Cerebrovascular Disease Maternal Grandmother      Celiac Disease Sister      Anemia Sister      Social History     Socioeconomic History     Marital status: Single     Number of children: 0     Years of education: College   Occupational History     Occupation: Works for a [employer]     Employer: [employer]   Tobacco Use     Smoking status: Never     Smokeless tobacco: Never   Substance and Sexual Activity     Alcohol use: Yes     Alcohol/week: 2.0 standard drinks of alcohol     Comment: Social alcohol intake     Drug use: No     Sexual  activity: Not Currently     Partners: Male     Birth control/protection: Pill   Other Topics Concern      Service No     Blood Transfusions Yes     Comment: Anemia 1997     Caffeine Concern No     Comment: 2-3 cups of coffee a day     Occupational Exposure Yes     Hobby Hazards No     Comment: Enjoys aqua classes, swimming, biking, walking, movies     Sleep Concern No     Comment: Wakes up several times a night, gets 6 hrs of sleep a night     Stress Concern No     Comment: Stress job, exercise to cope     Weight Concern Yes     Comment: Related to thyroid issues     Special Diet Yes     Comment: Vegetarian 4 years, gluten free     Back Care Yes     Comment: No diagnosis, has done PT     Exercise No     Comment: Walks, swimming, yoga, exercises 3-4 times a week for 1 hr each time     Bike Helmet Yes     Seat Belt Yes     Self-Exams Yes     Comment: breast   Social History Narrative    How much exercise per week? 3 days    How much calcium per day? 3 servings       How much caffeine per day? 2 cups coffee    How much vitamin D per day? 2000 iu    Do you/your family wear seatbelts?  Yes    Do you/your family use safety helmets? Yes    Do you/your family use sunscreen? Yes    Do you/your family keep firearms in the home? No    Do you/your family have a smoke detector(s)? Yes        Do you feel safe in your home? Yes    Has anyone ever touched you in an unwanted manner? No     Explain         November 18, 2014 Yaakov Cruz LPN        Relational: Previously , no children        Living situation: Previously was living in [place], moved to [place]        Support network: Close to friends and family       Review of Systems     Constitutional:  Negative for fever, chills, weight loss, weight gain, fatigue, decreased appetite, night sweats, recent stressors, height gain, height loss, post-operative complications, incisional pain, hallucinations, increased energy, hyperactivity and confused.   HENT:  Negative for ear  pain, hearing loss, tinnitus, nosebleeds, trouble swallowing, hoarse voice, mouth sores, sore throat, ear discharge, tooth pain, gum tenderness, taste disturbance, smell disturbance, hearing aid, bleeding gums, dry mouth, sinus pain, sinus congestion and neck mass.    Eyes:  Negative for double vision, pain, redness, eye pain, decreased vision, eye watering, eye bulging, eye dryness, flashing lights, spots, floaters, strabismus, tunnel vision, jaundice and eye irritation.   Respiratory:   Negative for cough, hemoptysis, sputum production, shortness of breath, wheezing, sleep disturbances due to breathing, snores loudly, respiratory pain, dyspnea on exertion, cough disturbing sleep and postural dyspnea.    Cardiovascular:  Negative for chest pain, dyspnea on exertion, palpitations, orthopnea, claudication, leg swelling, fingers/toes turn blue, hypertension, hypotension, syncope, history of heart murmur, chest pain on exertion, chest pain at rest, pacemaker, few scattered varicosities, leg pain, sleep disturbances due to breathing, tachycardia, light-headedness, exercise intolerance and edema.   Gastrointestinal:  Negative for heartburn, nausea, vomiting, abdominal pain, diarrhea, constipation, blood in stool, melena, rectal pain, bloating, hemorrhoids, bowel incontinence, jaundice, rectal bleeding, coffee ground emesis and change in stool.   Genitourinary:  Negative for bladder incontinence, dysuria, urgency, hematuria, flank pain, vaginal discharge, difficulty urinating, genital sores, dyspareunia, decreased libido, nocturia, voiding less frequently, arousal difficulty, abnormal vaginal bleeding, excessive menstruation, menstrual changes, hot flashes, vaginal dryness and postmenopausal bleeding.   Musculoskeletal:  Negative for myalgias, back pain, joint swelling, arthralgias, stiffness, muscle cramps, neck pain, bone pain, muscle weakness and fracture.   Skin:  Negative for nail changes, itching, poor wound  "healing, rash, hair changes, skin changes, acne, warts, poor wound healing, scarring, flaky skin, Raynaud's phenomenon, sensitivity to sunlight and skin thickening.   Neurological:  Negative for dizziness, tingling, tremors, speech change, seizures, loss of consciousness, weakness, light-headedness, numbness, headaches, disturbances in coordination, extremity numbness, memory loss, difficulty walking and paralysis.   Endo/Heme:  Negative for anemia, swollen glands and bruises/bleeds easily.   Psychiatric/Behavioral:  Negative for depression, hallucinations, memory loss, decreased concentration, mood swings and panic attacks.    Breast:  Negative for breast discharge, breast mass, breast pain and nipple retraction.   Endocrine:  Negative for altered temperature regulation, polyphagia, polydipsia, unwanted hair growth and change in facial hair.        12/12/2016     9:52 AM 4/24/2018     8:39 AM 4/12/2022     3:41 PM   PHQ-9 SCORE   PHQ-9 Total Score 0 4 2         3/28/2022     5:31 PM 4/12/2022     3:41 PM 4/17/2023     6:44 PM   REINA-7 SCORE   Total Score 0 (minimal anxiety)  0 (minimal anxiety)   Total Score 0    0 2 0       EXAM:  Blood pressure 125/83, pulse 89, height 1.676 m (5' 5.98\"), weight 78.2 kg (172 lb 8 oz), last menstrual period 04/09/2021, not currently breastfeeding. Body mass index is 27.86 kg/m .  General - pleasant female in no acute distress.  Skin - no suspicious lesions or rashes  EENT-  PERRLA, euthyroid with out palpable nodules  Neck - supple without lymphadenopathy.  Lungs - clear to auscultation bilaterally.  Heart - regular rate and rhythm without murmur.  Abdomen - soft, nontender, nondistended, no masses or organomegaly noted.  Musculoskeletal - no gross deformities.  Neurological - normal strength, sensation, and mental status.    Breast Exam:  Breast: Without visible skin changes. No dimpling or lesions seen.   Breasts supple, non-tender with palpation, no dominant mass, nodularity, or " nipple discharge noted bilaterally. Axillary nodes negative.      Pelvic Exam: Deferred at patient request        ASSESSMENT:  Encounter Diagnoses   Name Primary?     Annual physical exam Yes     Celiac disease      Acquired hypothyroidism         PLAN:   Celiac disease is managed by GI specialist. Hypothyroidism is managed by primary care and endocrinology.   -Education provided regarding pap smears; next pap is due 4/12/25.  -Mammogram and breast exam completed today.     Additional teaching done at this visit regarding self breast exam, exercise and mental health.    Return to clinic in one year.  Follow-up as needed.    I, Cierra Delaney, am serving as a scribe; to document services personally performed by  Brittni Watson CNM based on data collection and the provider's statements to me.     NGA Santos     I agree with the PFSH and ROS as completed by NGA Santos  except for changes made by me. The remainder of the encounter was performed by me and scribed by NGA Santos. The scribed note accurately reflects my personal services and decisions made by me.   MARGO Navarro CNM

## 2023-04-18 NOTE — LETTER
2023       RE: Paradise Michael  [address]  [facility] [address]     Dear Colleague,    Thank you for referring your patient, Paradise Michael, to the [facility] [place] at [facility]. Please see a copy of my visit note below.      Progress Note    SUBJECTIVE:  Paradise Michael is an 57 year old, , who requests an Annual Preventive Exam.     Concerns today include: Denies    Diet: pretty good. Pescatarian . Supplementing with legumes and fish.   Exercise: Try to do something everyday.  30 min to 1 hour walking, strength training, yoga/pilates  Sleep: 6-8 hours of sleep regularly. 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 Lives alone with her cat.   Denies HX of anxiety and depression.     Menopause: has been amenorrheic for 2 years. Has not noticed any VMS.      Menstrual History:      2018    10:40 AM 2021     1:40 PM 2022     2:40 PM   Menstrual History   LAST MENSTRUAL PERIOD 2018       Last    Lab Results   Component Value Date    PAP NIL 22     History of abnormal Pap smear: yes: 2 normals,  age 30- 65 PAP every 3 years recommended    Last   Lab Results   Component Value Date    HPV16 Negative 2022    HPV16 Negative 2021     Last   Lab Results   Component Value Date    HPV18 Negative 2022    HPV18 Negative 2021     Last   Lab Results   Component Value Date    HRHPV Negative 2022    HRHPV Negative 2021       Mammogram current: yes  Last Mammogram:   MA Screening Digital Bilateral    Result Date: 3/9/2021  Narrative: Examination: Bilateral digital screening mammography with computer aided detection. Comparison: 2017, 12/15/2016, 12/10/2015 History/family history: No symptoms, routine screening. BREAST DENSITY: Heterogeneously dense. COMMENTS: There has been no significant change.         Last Colonoscopy: Has had a colonoscopy due to celiac disease; due within the next two years.  Works with GI doctor through [facility] ([facility] [place])     HISTORY:  levothyroxine (SYNTHROID/LEVOTHROID) 88 MCG tablet, TAKE 1 TABLET BY MOUTH EVERY DAY    No current facility-administered medications on file prior to visit.    Allergies   Allergen Reactions    Gluten     Wheat      Immunization History   Administered Date(s) Administered    COVID-19 Vaccine 12+ (Pfizer ) 2022    COVID-19 Vaccine 12+ (Pfizer) 2021, 2021, 2021    COVID-19 Vaccine Bivalent Booster 12+ (Pfizer) 10/27/2022    Influenza (IIV3) PF 10/16/2021    Influenza,INJ,MDCK,PF,Quad >6mo(Flucelvax) 10/01/2020, 10/27/2022    TDAP Vaccine (Boostrix) 2016       OB History    Para Term  AB Living   0 0 0 0 0 0   SAB IAB Ectopic Multiple Live Births   0 0 0 0 0     Past Medical History:   Diagnosis Date    Abnormal Pap smear     Anemia     Bursitis of hip Right hip, twice    Celiac disease     Delayed menarche 15 yrs old    Hypothyroidism      Past Surgical History:   Procedure Laterality Date    COLONOSCOPY      COLPOSCOPY          COLPOSCOPY CERVIX, LOOP ELECTRODE BIOPSY, COMBINED       Family History   Problem Relation Age of Onset    Cerebrovascular Disease Maternal Grandfather     Diabetes Maternal Grandfather     Hypertension Mother     Endocrine Disease Mother         hypothyroidism    Thyroid Disease Mother     Hyperlipidemia Mother     Arthritis Mother     Cancer Mother         Bone marrow cancer    Cerebrovascular Disease Paternal Grandfather     Heart Surgery Father         CABG and stent placement    Arthritis Father     Cerebrovascular Disease Maternal Grandmother     Celiac Disease Sister     Anemia Sister      Social History     Socioeconomic History    Marital status: Single    Number of children: 0    Years of education: College   Occupational History    Occupation: Works for a  journal     Employer: [employer]   Tobacco Use    Smoking status: Never    Smokeless tobacco: Never   Substance and Sexual Activity    Alcohol use: Yes     Alcohol/week: 2.0 standard drinks of alcohol     Comment: Social alcohol intake    Drug use: No    Sexual activity: Not Currently     Partners: Male     Birth control/protection: Pill   Other Topics Concern     Service No    Blood Transfusions Yes     Comment: Anemia 1997    Caffeine Concern No     Comment: 2-3 cups of coffee a day    Occupational Exposure Yes    Hobby Hazards No     Comment: Enjoys aqua classes, swimming, biking, walking, movies    Sleep Concern No     Comment: Wakes up several times a night, gets 6 hrs of sleep a night    Stress Concern No     Comment: Stress job, exercise to cope    Weight Concern Yes     Comment: Related to thyroid issues    Special Diet Yes     Comment: Vegetarian 4 years, gluten free    Back Care Yes     Comment: No diagnosis, has done PT    Exercise No     Comment: Walks, swimming, yoga, exercises 3-4 times a week for 1 hr each time    Bike Helmet Yes    Seat Belt Yes    Self-Exams Yes     Comment: breast   Social History Narrative    How much exercise per week? 3 days    How much calcium per day? 3 servings       How much caffeine per day? 2 cups coffee    How much vitamin D per day? 2000 iu    Do you/your family wear seatbelts?  Yes    Do you/your family use safety helmets? Yes    Do you/your family use sunscreen? Yes    Do you/your family keep firearms in the home? No    Do you/your family have a smoke detector(s)? Yes        Do you feel safe in your home? Yes    Has anyone ever touched you in an unwanted manner? No     Explain         November 18, 2014 Yaakov Cruz LPN        Relational: Previously , no children        Living situation: Previously was living in [place], moved to [place]        Support network: Close to friends and family       Review of Systems     Constitutional:  Negative for  fever, chills, weight loss, weight gain, fatigue, decreased appetite, night sweats, recent stressors, height gain, height loss, post-operative complications, incisional pain, hallucinations, increased energy, hyperactivity and confused.   HENT:  Negative for ear pain, hearing loss, tinnitus, nosebleeds, trouble swallowing, hoarse voice, mouth sores, sore throat, ear discharge, tooth pain, gum tenderness, taste disturbance, smell disturbance, hearing aid, bleeding gums, dry mouth, sinus pain, sinus congestion and neck mass.    Eyes:  Negative for double vision, pain, redness, eye pain, decreased vision, eye watering, eye bulging, eye dryness, flashing lights, spots, floaters, strabismus, tunnel vision, jaundice and eye irritation.   Respiratory:   Negative for cough, hemoptysis, sputum production, shortness of breath, wheezing, sleep disturbances due to breathing, snores loudly, respiratory pain, dyspnea on exertion, cough disturbing sleep and postural dyspnea.    Cardiovascular:  Negative for chest pain, dyspnea on exertion, palpitations, orthopnea, claudication, leg swelling, fingers/toes turn blue, hypertension, hypotension, syncope, history of heart murmur, chest pain on exertion, chest pain at rest, pacemaker, few scattered varicosities, leg pain, sleep disturbances due to breathing, tachycardia, light-headedness, exercise intolerance and edema.   Gastrointestinal:  Negative for heartburn, nausea, vomiting, abdominal pain, diarrhea, constipation, blood in stool, melena, rectal pain, bloating, hemorrhoids, bowel incontinence, jaundice, rectal bleeding, coffee ground emesis and change in stool.   Genitourinary:  Negative for bladder incontinence, dysuria, urgency, hematuria, flank pain, vaginal discharge, difficulty urinating, genital sores, dyspareunia, decreased libido, nocturia, voiding less frequently, arousal difficulty, abnormal vaginal bleeding, excessive menstruation, menstrual changes, hot flashes,  "vaginal dryness and postmenopausal bleeding.   Musculoskeletal:  Negative for myalgias, back pain, joint swelling, arthralgias, stiffness, muscle cramps, neck pain, bone pain, muscle weakness and fracture.   Skin:  Negative for nail changes, itching, poor wound healing, rash, hair changes, skin changes, acne, warts, poor wound healing, scarring, flaky skin, Raynaud's phenomenon, sensitivity to sunlight and skin thickening.   Neurological:  Negative for dizziness, tingling, tremors, speech change, seizures, loss of consciousness, weakness, light-headedness, numbness, headaches, disturbances in coordination, extremity numbness, memory loss, difficulty walking and paralysis.   Endo/Heme:  Negative for anemia, swollen glands and bruises/bleeds easily.   Psychiatric/Behavioral:  Negative for depression, hallucinations, memory loss, decreased concentration, mood swings and panic attacks.    Breast:  Negative for breast discharge, breast mass, breast pain and nipple retraction.   Endocrine:  Negative for altered temperature regulation, polyphagia, polydipsia, unwanted hair growth and change in facial hair.        12/12/2016     9:52 AM 4/24/2018     8:39 AM 4/12/2022     3:41 PM   PHQ-9 SCORE   PHQ-9 Total Score 0 4 2         3/28/2022     5:31 PM 4/12/2022     3:41 PM 4/17/2023     6:44 PM   REINA-7 SCORE   Total Score 0 (minimal anxiety)  0 (minimal anxiety)   Total Score 0    0 2 0       EXAM:  Blood pressure 125/83, pulse 89, height 1.676 m (5' 5.98\"), weight 78.2 kg (172 lb 8 oz), last menstrual period 04/09/2021, not currently breastfeeding. Body mass index is 27.86 kg/m .  General - pleasant female in no acute distress.  Skin - no suspicious lesions or rashes  EENT-  PERRLA, euthyroid with out palpable nodules  Neck - supple without lymphadenopathy.  Lungs - clear to auscultation bilaterally.  Heart - regular rate and rhythm without murmur.  Abdomen - soft, nontender, nondistended, no masses or organomegaly " noted.  Musculoskeletal - no gross deformities.  Neurological - normal strength, sensation, and mental status.    Breast Exam:  Breast: Without visible skin changes. No dimpling or lesions seen.   Breasts supple, non-tender with palpation, no dominant mass, nodularity, or nipple discharge noted bilaterally. Axillary nodes negative.      Pelvic Exam: Deferred at patient request        ASSESSMENT:  Encounter Diagnoses   Name Primary?    Annual physical exam Yes    Celiac disease     Acquired hypothyroidism         PLAN:   Celiac disease is managed by GI specialist. Hypothyroidism is managed by primary care and endocrinology.   -Education provided regarding pap smears; next pap is due 4/12/25.  -Mammogram and breast exam completed today.     Additional teaching done at this visit regarding self breast exam, exercise and mental health.    Return to clinic in one year.  Follow-up as needed.    I, Cierra Delaney, am serving as a scribe; to document services personally performed by  Brittni Watson CNM based on data collection and the provider's statements to me.     NGA Santos     I agree with the PFSH and ROS as completed by NGA Santos  except for changes made by me. The remainder of the encounter was performed by me and scribed by NGA Santos. The scribed note accurately reflects my personal services and decisions made by me.   MARGO Navarro CNM

## 2023-04-18 NOTE — PATIENT INSTRUCTIONS
Thank you for trusting us with your care!     If you need to contact us for questions about:  Symptoms, Scheduling & Medical Questions; Non-urgent (2-3 day response) [place] message, Urgent (needing response today) [phone] (if after 3:30pm next day response)   Prescriptions: Please call your Pharmacy   Billing: [facility] [phone] or [facility]:[phone]

## 2023-05-04 ENCOUNTER — TELEPHONE (OUTPATIENT)
Dept: FAMILY MEDICINE | Facility: CLINIC | Age: 58
End: 2023-05-04

## 2023-05-04 DIAGNOSIS — Z86.2 HISTORY OF ANEMIA: Primary | ICD-10-CM

## 2023-05-04 NOTE — TELEPHONE ENCOUNTER
Who is calling? Patient  What do they need? Order for Iron and B12 levels  Is this an insurance referral? No  Does caller need a call back? No  Additional Comments: Pt is coming in for a TSH lab tomorrow and would like those 2 orders added on    Katherine

## 2023-05-05 ENCOUNTER — LAB (OUTPATIENT)
Dept: LAB | Facility: CLINIC | Age: 58
End: 2023-05-05
Payer: COMMERCIAL

## 2023-05-05 DIAGNOSIS — E03.8 OTHER SPECIFIED HYPOTHYROIDISM: ICD-10-CM

## 2023-05-05 DIAGNOSIS — Z86.2 HISTORY OF ANEMIA: ICD-10-CM

## 2023-05-05 LAB
IRON SERPL-MCNC: 93 UG/DL (ref 37–145)
TSH SERPL DL<=0.005 MIU/L-ACNC: 3.42 UIU/ML (ref 0.3–4.2)

## 2023-05-05 PROCEDURE — 82607 VITAMIN B-12: CPT

## 2023-05-05 PROCEDURE — 84443 ASSAY THYROID STIM HORMONE: CPT

## 2023-05-05 PROCEDURE — 83540 ASSAY OF IRON: CPT

## 2023-05-06 LAB — VIT B12 SERPL-MCNC: 331 PG/ML (ref 232–1245)

## 2023-05-09 DIAGNOSIS — E03.9 ACQUIRED HYPOTHYROIDISM: ICD-10-CM

## 2023-05-10 RX ORDER — LEVOTHYROXINE SODIUM 88 UG/1
TABLET ORAL
Qty: 90 TABLET | Refills: 3 | Status: SHIPPED | OUTPATIENT
Start: 2023-05-10 | End: 2024-08-02

## 2023-05-10 NOTE — TELEPHONE ENCOUNTER
Medication requested: levothyroxine (SYNTHROID/LEVOTHROID) 88 MCG tablet  Last office visit: 4/18/22  [facility] appointments: none  Medication last refilled: 11/1/22; 90 + 1 refill  Last qualifying labs:   Component      Latest Ref Rng 5/5/2023  2:52 PM   TSH      0.30 - 4.20 uIU/mL 3.42      Prescription approved per [facility] Refill Protocol.    Aristides ÁLVAREZ, RN  05/10/23 12:37 PM

## 2023-09-25 ENCOUNTER — OFFICE VISIT (OUTPATIENT)
Dept: FAMILY MEDICINE | Facility: CLINIC | Age: 58
End: 2023-09-25
Payer: COMMERCIAL

## 2023-09-25 VITALS
HEART RATE: 89 BPM | SYSTOLIC BLOOD PRESSURE: 117 MMHG | BODY MASS INDEX: 27.78 KG/M2 | OXYGEN SATURATION: 99 % | WEIGHT: 172 LBS | DIASTOLIC BLOOD PRESSURE: 81 MMHG | TEMPERATURE: 97.2 F

## 2023-09-25 DIAGNOSIS — J06.9 UPPER RESPIRATORY TRACT INFECTION, UNSPECIFIED TYPE: Primary | ICD-10-CM

## 2023-09-25 RX ORDER — CODEINE PHOSPHATE AND GUAIFENESIN 10; 100 MG/5ML; MG/5ML
1-2 SOLUTION ORAL EVERY 4 HOURS PRN
Qty: 180 ML | Refills: 0 | Status: SHIPPED | OUTPATIENT
Start: 2023-09-25

## 2023-09-25 RX ORDER — BENZONATATE 100 MG/1
100 CAPSULE ORAL 3 TIMES DAILY PRN
Qty: 30 CAPSULE | Refills: 1 | Status: SHIPPED | OUTPATIENT
Start: 2023-09-25

## 2023-09-25 NOTE — PROGRESS NOTES
Assessment & Plan   Problem List Items Addressed This Visit    None  Visit Diagnoses       Upper respiratory tract infection, unspecified type    -  Primary    Relevant Medications    amoxicillin-clavulanate (AUGMENTIN) 875-125 MG tablet    benzonatate (TESSALON) 100 MG capsule    guaiFENesin-codeine (ROBITUSSIN AC) 100-10 MG/5ML solution           URI, viral vs bacterial. Given duration of symptoms without improvement I have some concern for possible bacterial infection. Agreed to medications above for symptom management and ABX to start if patient continues to feel no improvement over this week.     25 minutes spent on the date of the encounter doing chart review, history and exam, documentation and further activities as noted.      MD RKYSTEN [facility]    Subjective   Paradise is a 58 year old, presenting for the following health issues:  Cold Symptoms (Started last Monday. Slight fever last week. Sore throat from coughing. )      HPI   Cold Symptoms  - 8 days ago symptoms started after she was on a flight  - head congestion, fatigue, sore throat, cough keeping her up at night  - has been taking Mucinex without much benefit  - also taking some Advil but wanted to make sure it is safe for her to take this  - tested negative for COVID twice        Review of Systems   Constitutional, HEENT, cardiovascular, pulmonary, gi and gu systems are negative, except as otherwise noted.      Objective    /81 (BP Location: Right arm, Patient Position: Sitting, Cuff Size: Adult Large)   Pulse 89   Temp 97.2  F (36.2  C) (Skin)   Wt 78 kg (172 lb)   LMP 04/09/2021   SpO2 99%   BMI 27.78 kg/m    There is no height or weight on file to calculate BMI.  Physical Exam   GENERAL: healthy, alert and no distress  NECK: bilateral lymphadenopathy, no asymmetry, masses, or scars and thyroid normal to palpation  RESP: Cough; lungs clear to auscultation - no rales, rhonchi or wheezes  CV: regular rate  and rhythm, normal S1 S2, no S3 or S4, no murmur, click or rub, no peripheral edema and peripheral pulses strong  ABDOMEN: soft, nontender, no hepatosplenomegaly, no masses and bowel sounds normal  MS: no gross musculoskeletal defects noted, no edema

## 2023-09-25 NOTE — NURSING NOTE
58 year old  Chief Complaint   Patient presents with    Cold Symptoms     Started last Monday. Slight fever last week. Sore throat from coughing.        Blood pressure 117/81, pulse 89, temperature 97.2  F (36.2  C), temperature source Skin, weight 78 kg (172 lb), last menstrual period 04/09/2021, SpO2 99 %, not currently breastfeeding. Body mass index is 27.78 kg/m .  Patient Active Problem List   Diagnosis    Presbyopia - Both Eyes    Screening for cervical cancer    Celiac disease    Hypothyroidism    Bursitis of left hip    Urge incontinence       Wt Readings from Last 2 Encounters:   09/25/23 78 kg (172 lb)   04/18/23 78.2 kg (172 lb 8 oz)     BP Readings from Last 3 Encounters:   09/25/23 117/81   04/18/23 125/83   07/26/22 139/87         Current Outpatient Medications   Medication    levothyroxine (SYNTHROID/LEVOTHROID) 88 MCG tablet     No current facility-administered medications for this visit.       Social History     Tobacco Use    Smoking status: Never    Smokeless tobacco: Never   Substance Use Topics    Alcohol use: Yes     Alcohol/week: 2.0 standard drinks of alcohol     Comment: Social alcohol intake    Drug use: No       Health Maintenance Due   Topic Date Due    ADVANCE CARE PLANNING  Never done    HEPATITIS B IMMUNIZATION (1 of 3 - 3-dose series) Never done    ZOSTER IMMUNIZATION (1 of 2) Never done    HPV TEST  04/12/2023    PAP  04/12/2023    INFLUENZA VACCINE (1) 09/01/2023       Lab Results   Component Value Date    PAP NIL 03/08/2021 September 25, 2023 9:50 AM

## 2024-05-16 DIAGNOSIS — E03.9 ACQUIRED HYPOTHYROIDISM: Primary | ICD-10-CM

## 2024-05-16 RX ORDER — LEVOTHYROXINE SODIUM 88 UG/1
88 TABLET ORAL DAILY
Qty: 5 TABLET | Refills: 0 | Status: SHIPPED | OUTPATIENT
Start: 2024-05-16 | End: 2024-08-02

## 2024-05-16 RX ORDER — LEVOTHYROXINE SODIUM 88 UG/1
88 TABLET ORAL DAILY
Qty: 90 TABLET | Refills: 3 | Status: CANCELLED | OUTPATIENT
Start: 2024-05-16

## 2024-05-22 ENCOUNTER — TRANSCRIBE ORDERS (OUTPATIENT)
Dept: OTHER | Age: 59
End: 2024-05-22

## 2024-07-06 ENCOUNTER — HEALTH MAINTENANCE LETTER (OUTPATIENT)
Age: 59
End: 2024-07-06

## 2024-08-02 DIAGNOSIS — E03.9 ACQUIRED HYPOTHYROIDISM: Primary | ICD-10-CM

## 2024-08-02 RX ORDER — LEVOTHYROXINE SODIUM 88 UG/1
88 TABLET ORAL DAILY
Qty: 30 TABLET | Refills: 0 | Status: SHIPPED | OUTPATIENT
Start: 2024-08-02 | End: 2024-08-28

## 2024-08-27 DIAGNOSIS — E03.9 ACQUIRED HYPOTHYROIDISM: ICD-10-CM

## 2024-08-28 RX ORDER — LEVOTHYROXINE SODIUM 88 UG/1
88 TABLET ORAL DAILY
Qty: 30 TABLET | Refills: 0 | Status: SHIPPED | OUTPATIENT
Start: 2024-08-28

## 2024-08-28 NOTE — TELEPHONE ENCOUNTER
Medication requested: levothyroxine (SYNTHROID/LEVOTHROID) 88 MCG tablet   Last office visit: 9/25/23  [facility] appointments: none  Medication last refilled: 8/2/24; 30 + 0 refills  Last qualifying labs:   Component      Latest Ref Rng 5/5/2023  2:52 PM   TSH      0.30 - 4.20 uIU/mL 3.42      Medication is being filled for 1 time refill only due to:  Patient needs to be seen because due for thyroid follow-up.    Sent pt message to schedule appt.    Aristides ÁLVAREZ, RN  08/28/24 9:02 AM

## 2024-09-03 DIAGNOSIS — E03.9 ACQUIRED HYPOTHYROIDISM: ICD-10-CM

## 2024-09-05 RX ORDER — LEVOTHYROXINE SODIUM 88 UG/1
88 TABLET ORAL DAILY
Qty: 90 TABLET | Refills: 1 | OUTPATIENT
Start: 2024-09-05

## 2024-10-15 DIAGNOSIS — E03.9 ACQUIRED HYPOTHYROIDISM: ICD-10-CM

## 2024-10-15 RX ORDER — LEVOTHYROXINE SODIUM 88 UG/1
88 TABLET ORAL DAILY
Qty: 30 TABLET | Refills: 0 | OUTPATIENT
Start: 2024-10-15

## 2024-10-15 NOTE — TELEPHONE ENCOUNTER
Levothyroxine (Synthroid) 88 mcg    Last Office Visit: 9/25/23  [facility] Appointments: None  Medication last refilled: 8/28/24 #30 with 0 refill(s)    Required labs per protocol:  LAB REF RANGE 7/26/22 5/5/23   TSH 0.40-4.0 mU/L 1.08 3.42     Refill denied.  Patient has been sent a message already to call and make an appointment.  It says it hasn't been ready, but she has logged into [employer] since the message was sent to her.    Message sent to scheduling to call the patient and schedule an appointment with Dr. Mena.    Regina Joyce, BSN, RN, CCM

## 2024-10-16 DIAGNOSIS — E03.9 ACQUIRED HYPOTHYROIDISM: ICD-10-CM

## 2024-10-16 RX ORDER — LEVOTHYROXINE SODIUM 88 UG/1
88 TABLET ORAL DAILY
Qty: 30 TABLET | Refills: 0 | Status: SHIPPED | OUTPATIENT
Start: 2024-10-16 | End: 2024-11-08

## 2024-10-16 NOTE — TELEPHONE ENCOUNTER
Who is calling? Patient  Medication name: levothyroxine (SYNTHROID/LEVOTHROID) 88 MCG tablet   Is this a refill request? Yes  Have they contacted the pharmacy?  Yes  Pharmacy:   [id number] - [place], [place] - [address]  [address] [place] [address]  Phone: [phone] Fax: [phone]   Question/Concern: Moved to [place], the soonest she can see her new primary is 11/8. Requesting temporary refill until then.   Would patient like a call back? No

## 2024-10-16 NOTE — TELEPHONE ENCOUNTER
Paradise called stating she has moved to [place] and has an appointment with a new PCP on 11/8/24.  She is asking for a 30-day refill to bridge the gap until she can be seen by her new PCP.  Refill sent to new pharmacy in [place].  KIMMIE IsidroN, RN, [facility]  RN Care Coordinator  [facility]  10/16/24  11:01 AM  Phone: [phone]

## 2024-11-05 ASSESSMENT — ANXIETY QUESTIONNAIRES: GAD7 TOTAL SCORE: 0

## 2024-11-08 DIAGNOSIS — E03.9 ACQUIRED HYPOTHYROIDISM: ICD-10-CM

## 2024-11-08 RX ORDER — LEVOTHYROXINE SODIUM 88 UG/1
88 TABLET ORAL DAILY
Qty: 30 TABLET | Refills: 0 | Status: SHIPPED | OUTPATIENT
Start: 2024-11-08

## 2024-11-08 NOTE — TELEPHONE ENCOUNTER
Medication requested: levothyroxine (SYNTHROID/LEVOTHROID) 88 MCG tablet   Last office visit: 9/25/2023  [facility] appointments:   Medication last refilled: 10/16/2024; 30 tabs + 0 refills  Last qualifying labs:     Component      Latest Ref Rng 5/5/2023  2:52 PM   TSH      0.30 - 4.20 uIU/mL 3.42      Medication is being filled for 1 time refill only due to:  Patient needs to be seen because it has been more than one year since last visit.  [facility] message sent to pt.    HENRI Puckett, RN  11/08/24, 4:15 PM

## 2024-12-04 DIAGNOSIS — E03.9 ACQUIRED HYPOTHYROIDISM: ICD-10-CM

## 2024-12-04 RX ORDER — LEVOTHYROXINE SODIUM 88 UG/1
88 TABLET ORAL DAILY
Qty: 90 TABLET | Refills: 1 | OUTPATIENT
Start: 2024-12-04

## 2025-06-22 ENCOUNTER — HEALTH MAINTENANCE LETTER (OUTPATIENT)
Age: 60
End: 2025-06-22

## 2025-07-13 ENCOUNTER — HEALTH MAINTENANCE LETTER (OUTPATIENT)
Age: 60
End: 2025-07-13